# Patient Record
Sex: FEMALE | Race: WHITE | NOT HISPANIC OR LATINO | Employment: OTHER | URBAN - METROPOLITAN AREA
[De-identification: names, ages, dates, MRNs, and addresses within clinical notes are randomized per-mention and may not be internally consistent; named-entity substitution may affect disease eponyms.]

---

## 2023-04-13 DIAGNOSIS — M15.9 PRIMARY OSTEOARTHRITIS INVOLVING MULTIPLE JOINTS: Primary | ICD-10-CM

## 2023-05-12 ENCOUNTER — APPOINTMENT (OUTPATIENT)
Dept: PRIMARY CARE | Facility: CLINIC | Age: 68
End: 2023-05-12
Payer: MEDICARE

## 2023-05-15 ENCOUNTER — APPOINTMENT (OUTPATIENT)
Dept: PRIMARY CARE | Facility: CLINIC | Age: 68
End: 2023-05-15
Payer: MEDICARE

## 2023-06-21 ENCOUNTER — APPOINTMENT (OUTPATIENT)
Dept: PRIMARY CARE | Facility: CLINIC | Age: 68
End: 2023-06-21
Payer: MEDICARE

## 2023-07-13 ENCOUNTER — OFFICE VISIT (OUTPATIENT)
Dept: PRIMARY CARE | Facility: CLINIC | Age: 68
End: 2023-07-13
Payer: MEDICARE

## 2023-07-13 VITALS
HEART RATE: 81 BPM | WEIGHT: 293 LBS | DIASTOLIC BLOOD PRESSURE: 115 MMHG | HEIGHT: 65 IN | SYSTOLIC BLOOD PRESSURE: 202 MMHG | BODY MASS INDEX: 48.82 KG/M2 | OXYGEN SATURATION: 95 %

## 2023-07-13 DIAGNOSIS — M25.561 CHRONIC PAIN OF BOTH KNEES: ICD-10-CM

## 2023-07-13 DIAGNOSIS — N18.32 CHRONIC KIDNEY DISEASE, STAGE 3B (MULTI): ICD-10-CM

## 2023-07-13 DIAGNOSIS — N18.2 CHRONIC KIDNEY DISEASE, STAGE 2 (MILD): ICD-10-CM

## 2023-07-13 DIAGNOSIS — M25.562 CHRONIC PAIN OF BOTH KNEES: ICD-10-CM

## 2023-07-13 DIAGNOSIS — R73.03 PREDIABETES: ICD-10-CM

## 2023-07-13 DIAGNOSIS — Z12.31 ENCOUNTER FOR SCREENING MAMMOGRAM FOR MALIGNANT NEOPLASM OF BREAST: Primary | ICD-10-CM

## 2023-07-13 DIAGNOSIS — G89.29 CHRONIC PAIN OF BOTH KNEES: ICD-10-CM

## 2023-07-13 DIAGNOSIS — Z99.89 DEPENDENT ON WALKER FOR AMBULATION: ICD-10-CM

## 2023-07-13 DIAGNOSIS — I10 BENIGN ESSENTIAL HYPERTENSION: ICD-10-CM

## 2023-07-13 DIAGNOSIS — Z00.00 ROUTINE GENERAL MEDICAL EXAMINATION AT HEALTH CARE FACILITY: ICD-10-CM

## 2023-07-13 DIAGNOSIS — I48.91 UNSPECIFIED ATRIAL FIBRILLATION (MULTI): ICD-10-CM

## 2023-07-13 DIAGNOSIS — K21.9 GASTROESOPHAGEAL REFLUX DISEASE WITHOUT ESOPHAGITIS: ICD-10-CM

## 2023-07-13 DIAGNOSIS — N18.4 CHRONIC KIDNEY DISEASE, STAGE 4 (SEVERE) (MULTI): ICD-10-CM

## 2023-07-13 DIAGNOSIS — E55.9 VITAMIN D DEFICIENCY: ICD-10-CM

## 2023-07-13 DIAGNOSIS — E03.8 SUBCLINICAL HYPOTHYROIDISM: ICD-10-CM

## 2023-07-13 DIAGNOSIS — F41.9 ANXIETY: ICD-10-CM

## 2023-07-13 PROBLEM — I89.0 LYMPHEDEMA: Status: ACTIVE | Noted: 2023-07-13

## 2023-07-13 PROBLEM — L03.119 CELLULITIS OF LOWER LEG: Status: ACTIVE | Noted: 2023-07-13

## 2023-07-13 PROBLEM — R60.0 BILATERAL EDEMA OF LOWER EXTREMITY: Status: ACTIVE | Noted: 2023-07-13

## 2023-07-13 PROBLEM — R06.02 SHORTNESS OF BREATH: Status: ACTIVE | Noted: 2023-07-13

## 2023-07-13 PROCEDURE — G0439 PPPS, SUBSEQ VISIT: HCPCS | Performed by: NURSE PRACTITIONER

## 2023-07-13 PROCEDURE — 3077F SYST BP >= 140 MM HG: CPT | Performed by: NURSE PRACTITIONER

## 2023-07-13 PROCEDURE — 1170F FXNL STATUS ASSESSED: CPT | Performed by: NURSE PRACTITIONER

## 2023-07-13 PROCEDURE — 1125F AMNT PAIN NOTED PAIN PRSNT: CPT | Performed by: NURSE PRACTITIONER

## 2023-07-13 PROCEDURE — 1036F TOBACCO NON-USER: CPT | Performed by: NURSE PRACTITIONER

## 2023-07-13 PROCEDURE — 1159F MED LIST DOCD IN RCRD: CPT | Performed by: NURSE PRACTITIONER

## 2023-07-13 PROCEDURE — 99214 OFFICE O/P EST MOD 30 MIN: CPT | Performed by: NURSE PRACTITIONER

## 2023-07-13 PROCEDURE — 3080F DIAST BP >= 90 MM HG: CPT | Performed by: NURSE PRACTITIONER

## 2023-07-13 PROCEDURE — 1160F RVW MEDS BY RX/DR IN RCRD: CPT | Performed by: NURSE PRACTITIONER

## 2023-07-13 RX ORDER — HYDRALAZINE HYDROCHLORIDE 50 MG/1
50 TABLET, FILM COATED ORAL 2 TIMES DAILY
Qty: 90 TABLET | Refills: 1 | Status: SHIPPED | OUTPATIENT
Start: 2023-07-13 | End: 2023-07-31

## 2023-07-13 RX ORDER — OMEPRAZOLE 40 MG/1
40 CAPSULE, DELAYED RELEASE ORAL
Qty: 90 CAPSULE | Refills: 1 | Status: SHIPPED | OUTPATIENT
Start: 2023-07-13

## 2023-07-13 RX ORDER — DILTIAZEM HYDROCHLORIDE 360 MG/1
360 CAPSULE, EXTENDED RELEASE ORAL EVERY MORNING
Qty: 90 CAPSULE | Refills: 1 | Status: SHIPPED | OUTPATIENT
Start: 2023-07-13

## 2023-07-13 RX ORDER — OMEPRAZOLE 40 MG/1
40 CAPSULE, DELAYED RELEASE ORAL
COMMUNITY
Start: 2021-06-23 | End: 2023-07-13 | Stop reason: SDUPTHER

## 2023-07-13 RX ORDER — GABAPENTIN 100 MG/1
100 CAPSULE ORAL 3 TIMES DAILY
COMMUNITY
End: 2023-07-13 | Stop reason: SDUPTHER

## 2023-07-13 RX ORDER — HYDRALAZINE HYDROCHLORIDE 50 MG/1
75 TABLET, FILM COATED ORAL 2 TIMES DAILY
COMMUNITY
Start: 2022-05-02 | End: 2023-07-13 | Stop reason: SDUPTHER

## 2023-07-13 RX ORDER — GABAPENTIN 300 MG/1
300 CAPSULE ORAL 3 TIMES DAILY
Qty: 270 CAPSULE | Refills: 1 | Status: SHIPPED | OUTPATIENT
Start: 2023-07-13

## 2023-07-13 ASSESSMENT — ACTIVITIES OF DAILY LIVING (ADL)
DOING_HOUSEWORK: NEEDS ASSISTANCE
MANAGING_FINANCES: INDEPENDENT
DRESSING: INDEPENDENT
BATHING: NEEDS ASSISTANCE
GROCERY_SHOPPING: NEEDS ASSISTANCE
TAKING_MEDICATION: INDEPENDENT

## 2023-07-13 ASSESSMENT — ENCOUNTER SYMPTOMS
PSYCHIATRIC NEGATIVE: 1
FEVER: 0
CHILLS: 0
SORE THROAT: 0
WEAKNESS: 0
COUGH: 0
DIZZINESS: 0
PALPITATIONS: 0
DIARRHEA: 0
FATIGUE: 0
NUMBNESS: 0
VOMITING: 0
ABDOMINAL PAIN: 0
NAUSEA: 0
HEADACHES: 0
SHORTNESS OF BREATH: 0
MUSCULOSKELETAL NEGATIVE: 1
CONSTIPATION: 0

## 2023-07-13 ASSESSMENT — PATIENT HEALTH QUESTIONNAIRE - PHQ9
2. FEELING DOWN, DEPRESSED OR HOPELESS: NOT AT ALL
SUM OF ALL RESPONSES TO PHQ9 QUESTIONS 1 AND 2: 0
1. LITTLE INTEREST OR PLEASURE IN DOING THINGS: NOT AT ALL

## 2023-07-13 NOTE — PROGRESS NOTES
"Subjective   Patient ID: Chanda Gallego is a 68 y.o. female who presents for follow up.    HPI   Not doing well overall.  Feeling generally not well.  Knees are hurting her this summer. Using Tylenol for the pain.    Concerned about her kidneys.  Has history of worsening kidney function.  Has not seen nephrology.  BP is not controlled.  Has been taking BP at home.  Mostly 140/80.  When it reaches 170 systolic she takes a hydralazine.  Has a history of afib?  Does not take any medications due to cost.  Denies chest pain, SOB, palpitations, dizziness,  or GI issues.        Review of Systems   Constitutional:  Negative for chills, fatigue and fever.   HENT:  Negative for congestion, ear pain and sore throat.    Eyes:  Negative for visual disturbance.   Respiratory:  Negative for cough and shortness of breath.    Cardiovascular:  Negative for chest pain, palpitations and leg swelling.   Gastrointestinal:  Negative for abdominal pain, constipation, diarrhea, nausea and vomiting.   Genitourinary: Negative.    Musculoskeletal: Negative.    Skin:  Negative for rash.   Neurological:  Negative for dizziness, weakness, numbness and headaches.   Psychiatric/Behavioral: Negative.         Objective   BP (!) 202/115   Pulse 81   Ht 1.651 m (5' 5\")   Wt 135 kg (297 lb)   SpO2 95%   BMI 49.42 kg/m²     Physical Exam  Constitutional:       General: She is not in acute distress.     Appearance: Normal appearance.   HENT:      Head: Normocephalic and atraumatic.      Right Ear: Tympanic membrane, ear canal and external ear normal.      Left Ear: Tympanic membrane, ear canal and external ear normal.      Nose: Nose normal.      Mouth/Throat:      Mouth: Mucous membranes are moist.      Pharynx: Oropharynx is clear.   Eyes:      Extraocular Movements: Extraocular movements intact.      Conjunctiva/sclera: Conjunctivae normal.      Pupils: Pupils are equal, round, and reactive to light.   Cardiovascular:      Rate and Rhythm: " Normal rate and regular rhythm.      Pulses: Normal pulses.      Heart sounds: Normal heart sounds. No murmur heard.  Pulmonary:      Effort: Pulmonary effort is normal.      Breath sounds: Normal breath sounds. No wheezing, rhonchi or rales.   Abdominal:      General: Bowel sounds are normal.      Palpations: Abdomen is soft.      Tenderness: There is no abdominal tenderness.   Musculoskeletal:         General: Normal range of motion.      Cervical back: Normal range of motion and neck supple.   Lymphadenopathy:      Comments: No lymphadenopathy noted   Skin:     General: Skin is warm and dry.      Findings: No rash.   Neurological:      General: No focal deficit present.      Mental Status: She is alert and oriented to person, place, and time.      Cranial Nerves: No cranial nerve deficit.      Coordination: Coordination normal.      Gait: Gait normal.   Psychiatric:         Mood and Affect: Mood normal.         Behavior: Behavior normal.       Assessment/Plan   Problem List Items Addressed This Visit       GERD (gastroesophageal reflux disease)     Take omeprazole as directed         Relevant Medications    omeprazole (PriLOSEC) 40 mg DR capsule    Anxiety    Benign essential hypertension     Continue Tiadylt  mg every 24 hours  Take Hydralazine 50 twice daily         Relevant Medications    dilTIAZem ER (Tiadylt ER) 360 mg 24 hr capsule    hydrALAZINE (Apresoline) 50 mg tablet    Chronic kidney disease, stage 3b (CMS/HCC)     Have fasting labs drawn         Relevant Orders    Comprehensive Metabolic Panel    Chronic pain of both knees     Increase Neurontin to 300 mg three times daily for pain.  May take Tylenol for break through pain.         Relevant Medications    gabapentin (Neurontin) 300 mg capsule    Subclinical hypothyroidism     Have labs drawn as soon as possible         Relevant Orders    TSH with reflex to Free T4 if abnormal    Vitamin D deficiency     Have labs drawn as soon as possible           Prediabetes     Have labs drawn as soon as possible          Relevant Orders    Comprehensive Metabolic Panel    TSH with reflex to Free T4 if abnormal    Lipid Panel    Vitamin D, Total    CBC and Auto Differential    BI mammo bilateral screening tomosynthesis    Hemoglobin A1C    Routine general medical examination at health care facility - Primary     Other Visit Diagnoses       Unspecified atrial fibrillation (CMS/HCC)        Relevant Medications    dilTIAZem ER (Tiadylt ER) 360 mg 24 hr capsule    Chronic kidney disease, stage 2 (mild)        Relevant Orders    Lipid Panel    Chronic kidney disease, stage 4 (severe) (CMS/HCC)        Relevant Orders    Vitamin D, Total        Ordered rollator for decreased ability to ambulate without assistance  Patient permanently defers mammogram or colon screening

## 2023-07-13 NOTE — PATIENT INSTRUCTIONS
Start Neurontin 300 mg 3 times a day for pain neuro gabapentin vascular okay  May take Tylenol as needed for breakthrough pain  Monitor and record blood pressure at home  Continue Cardizem and hydralazine for blood pressure as prescribed  Reduce dietary sodium  Have fasting blood work drawn.  Must fast for 12 hours can have black coffee,.  Water, or tea.  Schedule annual mammogram patient refuses  Will consider Nephrology consult, patient not interested at this point.

## 2023-07-13 NOTE — ASSESSMENT & PLAN NOTE
Increase Neurontin to 300 mg three times daily for pain.  May take Tylenol for break through pain.

## 2023-07-28 DIAGNOSIS — I10 BENIGN ESSENTIAL HYPERTENSION: ICD-10-CM

## 2023-07-31 RX ORDER — HYDRALAZINE HYDROCHLORIDE 50 MG/1
50 TABLET, FILM COATED ORAL 2 TIMES DAILY
Qty: 180 TABLET | Refills: 1 | Status: SHIPPED | OUTPATIENT
Start: 2023-07-31

## 2024-06-20 DIAGNOSIS — I48.91 UNSPECIFIED ATRIAL FIBRILLATION (MULTI): ICD-10-CM

## 2024-06-20 DIAGNOSIS — M25.562 CHRONIC PAIN OF BOTH KNEES: ICD-10-CM

## 2024-06-20 DIAGNOSIS — K21.9 GASTROESOPHAGEAL REFLUX DISEASE WITHOUT ESOPHAGITIS: ICD-10-CM

## 2024-06-20 DIAGNOSIS — M25.561 CHRONIC PAIN OF BOTH KNEES: ICD-10-CM

## 2024-06-20 DIAGNOSIS — G89.29 CHRONIC PAIN OF BOTH KNEES: ICD-10-CM

## 2024-06-20 DIAGNOSIS — I10 BENIGN ESSENTIAL HYPERTENSION: ICD-10-CM

## 2024-06-21 DIAGNOSIS — E55.9 VITAMIN D DEFICIENCY: ICD-10-CM

## 2024-06-21 DIAGNOSIS — Z00.00 ROUTINE MEDICAL EXAM: ICD-10-CM

## 2024-06-21 DIAGNOSIS — R60.0 BILATERAL EDEMA OF LOWER EXTREMITY: ICD-10-CM

## 2024-06-21 DIAGNOSIS — R53.83 FATIGUE, UNSPECIFIED TYPE: ICD-10-CM

## 2024-06-21 DIAGNOSIS — I10 BENIGN ESSENTIAL HYPERTENSION: ICD-10-CM

## 2024-06-21 DIAGNOSIS — R73.03 PREDIABETES: ICD-10-CM

## 2024-06-21 DIAGNOSIS — N18.32 CHRONIC KIDNEY DISEASE, STAGE 3B (MULTI): ICD-10-CM

## 2024-06-21 RX ORDER — HYDRALAZINE HYDROCHLORIDE 50 MG/1
50 TABLET, FILM COATED ORAL 2 TIMES DAILY
Qty: 60 TABLET | Refills: 0 | Status: SHIPPED | OUTPATIENT
Start: 2024-06-21

## 2024-06-21 RX ORDER — DILTIAZEM HYDROCHLORIDE 360 MG/1
360 CAPSULE, EXTENDED RELEASE ORAL DAILY
Qty: 30 CAPSULE | Refills: 0 | Status: SHIPPED | OUTPATIENT
Start: 2024-06-21 | End: 2024-07-21

## 2024-06-21 RX ORDER — GABAPENTIN 300 MG/1
300 CAPSULE ORAL 3 TIMES DAILY
Qty: 90 CAPSULE | Refills: 0 | Status: SHIPPED | OUTPATIENT
Start: 2024-06-21

## 2024-06-21 RX ORDER — OMEPRAZOLE 40 MG/1
40 CAPSULE, DELAYED RELEASE ORAL
Qty: 30 CAPSULE | Refills: 0 | Status: SHIPPED | OUTPATIENT
Start: 2024-06-21

## 2024-07-16 ENCOUNTER — APPOINTMENT (OUTPATIENT)
Dept: PRIMARY CARE | Facility: CLINIC | Age: 69
End: 2024-07-16
Payer: MEDICARE

## 2024-07-16 VITALS
BODY MASS INDEX: 49.92 KG/M2 | WEIGHT: 293 LBS | SYSTOLIC BLOOD PRESSURE: 212 MMHG | HEART RATE: 68 BPM | OXYGEN SATURATION: 95 % | DIASTOLIC BLOOD PRESSURE: 108 MMHG

## 2024-07-16 DIAGNOSIS — G89.29 CHRONIC PAIN OF BOTH KNEES: ICD-10-CM

## 2024-07-16 DIAGNOSIS — N18.4 CHRONIC KIDNEY DISEASE, STAGE 4 (SEVERE) (MULTI): Primary | ICD-10-CM

## 2024-07-16 DIAGNOSIS — I10 BENIGN ESSENTIAL HYPERTENSION: ICD-10-CM

## 2024-07-16 DIAGNOSIS — I50.42 CHRONIC COMBINED SYSTOLIC AND DIASTOLIC CONGESTIVE HEART FAILURE (MULTI): ICD-10-CM

## 2024-07-16 DIAGNOSIS — I48.91 UNSPECIFIED ATRIAL FIBRILLATION (MULTI): ICD-10-CM

## 2024-07-16 DIAGNOSIS — M25.562 CHRONIC PAIN OF BOTH KNEES: ICD-10-CM

## 2024-07-16 DIAGNOSIS — M25.561 CHRONIC PAIN OF BOTH KNEES: ICD-10-CM

## 2024-07-16 PROCEDURE — 3077F SYST BP >= 140 MM HG: CPT | Performed by: INTERNAL MEDICINE

## 2024-07-16 PROCEDURE — 1159F MED LIST DOCD IN RCRD: CPT | Performed by: INTERNAL MEDICINE

## 2024-07-16 PROCEDURE — 99214 OFFICE O/P EST MOD 30 MIN: CPT | Performed by: INTERNAL MEDICINE

## 2024-07-16 PROCEDURE — 1160F RVW MEDS BY RX/DR IN RCRD: CPT | Performed by: INTERNAL MEDICINE

## 2024-07-16 PROCEDURE — 3080F DIAST BP >= 90 MM HG: CPT | Performed by: INTERNAL MEDICINE

## 2024-07-16 PROCEDURE — 1036F TOBACCO NON-USER: CPT | Performed by: INTERNAL MEDICINE

## 2024-07-16 RX ORDER — HYDRALAZINE HYDROCHLORIDE 50 MG/1
50 TABLET, FILM COATED ORAL 2 TIMES DAILY
Qty: 180 TABLET | Refills: 1 | Status: SHIPPED | OUTPATIENT
Start: 2024-07-16

## 2024-07-16 RX ORDER — GABAPENTIN 300 MG/1
300 CAPSULE ORAL 3 TIMES DAILY
Qty: 270 CAPSULE | Refills: 1 | Status: SHIPPED | OUTPATIENT
Start: 2024-07-16

## 2024-07-16 RX ORDER — DILTIAZEM HYDROCHLORIDE 360 MG/1
360 CAPSULE, EXTENDED RELEASE ORAL DAILY
Qty: 90 CAPSULE | Refills: 1 | Status: SHIPPED | OUTPATIENT
Start: 2024-07-16 | End: 2025-01-12

## 2024-07-16 NOTE — ASSESSMENT & PLAN NOTE
Pt has not followed up with nephrology  States she does not urinate enough to get a 24hr urine collection  Encouraged to get labs  Avoid nsaids  Limit sodium d/w pt  Hydration  Encouraged for follow up with nephrology

## 2024-07-16 NOTE — PATIENT INSTRUCTIONS
Hypertension  Your bp was very high today, but on repeat a little better, but still high  You need to restart your hydralazine 50mg twice daily  Continue diltiazem    Atrial fibrillation,   Your heart is regular today  You are not taking the eliquis, still unclear why, but had to do with paperwork  Please follow up with cardiology annually    Kidney disease, suddenly worse due to bilateral kidney stones  Get labs, you have not had labs in over 2 years, very important to check regularly  Recheck in 6months  Follow up with nephrology    Urine incontinence, see urology    Declines mammogram and colon screening    Recommend the following vaccines  Prevnar 20 to prevent pneumonia  RSV shot to prevent RSV  Tdap, tetanus, which is due every 10 years  These are all free at the pharmacy    Get labs today, orders are in the computer

## 2024-07-16 NOTE — PROGRESS NOTES
Subjective   Patient ID: Chanda Gallego is a 69 y.o. female who presents for No chief complaint on file..    She takes omeprazole prn, usually after red sauce. No food sticking  Has a prior esophageal stretching  She has not been taking her hydralazine because had to change to the mail order, but could not get from the mail order    Also not taking diltiazem. Or the hydralazine  Pt states she did not know she had bad kidneys. But then saw nephrology and they told her to avoid sodium  She has trouble with bladder control.     She had cardioversion 2 years ago  No palpitations  She has not been taking her blood thinner eliquis through patient assistance, but was too many pages for her ?? Or was too many copies for her (unclear why ), she has not been taking, got a 1 month free.   She takes tylenol  She takes gabapentin for pain in her knees and thumbs that is arthritis    Her knees, she saw orthopedics, who said she did not qualify for surgery but got a shot in her knee  The gabapentin makes her sleepy.     No cp or pressure  She is not sob  No palpitations, she had a monitor but sometimes gets fleeting palp  Bowels are regular. Sometimes constipated. Takes a salad  She gets very tired.   Poor urine control.   She doesn't sleep more than 3 hours and wakes and will sleep 3 hours more.   She takes 1 joby 3 times a day.    Had stretching and botox in her esophagus.   No food sticking currently.     She doesn't do the flu shot due to eggs in it.   She can eat an egg sandwich  She is walking with a walker.          Review of Systems    Objective   BP (!) 212/108   Pulse 68   Wt 136 kg (300 lb)   SpO2 95%   BMI 49.92 kg/m²     Physical Exam  Constitutional:       Appearance: Normal appearance.   Neck:      Vascular: No carotid bruit.   Cardiovascular:      Rate and Rhythm: Normal rate and regular rhythm.      Heart sounds: No murmur heard.  Pulmonary:      Effort: Pulmonary effort is normal.      Breath sounds: Normal  breath sounds.   Abdominal:      Palpations: Abdomen is soft.      Tenderness: There is no abdominal tenderness.   Musculoskeletal:      Right lower leg: Edema (1+) present.      Left lower leg: Edema (2+) present.   Lymphadenopathy:      Cervical: No cervical adenopathy.   Skin:     Coloration: Skin is not jaundiced or pale.   Neurological:      Mental Status: She is alert and oriented to person, place, and time.         Assessment/Plan          Patient Instructions   Hypertension  Your bp was very high today, but on repeat a little better, but still high  You need to restart your hydralazine 50mg twice daily  Continue diltiazem    Atrial fibrillation,   Your heart is regular today  You are not taking the eliquis, still unclear why, but had to do with paperwork  Please follow up with cardiology annually    Kidney disease, suddenly worse due to bilateral kidney stones  Get labs, you have not had labs in over 2 years, very important to check regularly  Recheck in 6months  Follow up with nephrology    Urine incontinence, see urology    Declines mammogram and colon screening    Recommend the following vaccines  Prevnar 20 to prevent pneumonia  RSV shot to prevent RSV  Tdap, tetanus, which is due every 10 years  These are all free at the pharmacy    Get labs today, orders are in the computer

## 2024-08-15 ENCOUNTER — LAB (OUTPATIENT)
Dept: LAB | Facility: LAB | Age: 69
End: 2024-08-15
Payer: MEDICARE

## 2024-08-15 DIAGNOSIS — N18.32 CHRONIC KIDNEY DISEASE, STAGE 3B (MULTI): ICD-10-CM

## 2024-08-15 DIAGNOSIS — R73.03 PREDIABETES: ICD-10-CM

## 2024-08-15 DIAGNOSIS — R53.83 FATIGUE, UNSPECIFIED TYPE: ICD-10-CM

## 2024-08-15 DIAGNOSIS — E55.9 VITAMIN D DEFICIENCY: ICD-10-CM

## 2024-08-15 DIAGNOSIS — R60.0 BILATERAL EDEMA OF LOWER EXTREMITY: ICD-10-CM

## 2024-08-15 DIAGNOSIS — Z00.00 ROUTINE MEDICAL EXAM: ICD-10-CM

## 2024-08-15 DIAGNOSIS — I10 BENIGN ESSENTIAL HYPERTENSION: ICD-10-CM

## 2024-08-15 LAB
25(OH)D3 SERPL-MCNC: 9 NG/ML (ref 30–100)
ALBUMIN SERPL BCP-MCNC: 4.4 G/DL (ref 3.4–5)
ALP SERPL-CCNC: 92 U/L (ref 33–136)
ALT SERPL W P-5'-P-CCNC: 9 U/L (ref 7–45)
ANION GAP SERPL CALC-SCNC: 15 MMOL/L (ref 10–20)
AST SERPL W P-5'-P-CCNC: 11 U/L (ref 9–39)
BILIRUB SERPL-MCNC: 0.6 MG/DL (ref 0–1.2)
BUN SERPL-MCNC: 42 MG/DL (ref 6–23)
CALCIUM SERPL-MCNC: 8.8 MG/DL (ref 8.6–10.3)
CHLORIDE SERPL-SCNC: 108 MMOL/L (ref 98–107)
CHOLEST SERPL-MCNC: 160 MG/DL (ref 0–199)
CHOLESTEROL/HDL RATIO: 3.2
CO2 SERPL-SCNC: 25 MMOL/L (ref 21–32)
CREAT SERPL-MCNC: 2.81 MG/DL (ref 0.5–1.05)
EGFRCR SERPLBLD CKD-EPI 2021: 17 ML/MIN/1.73M*2
ERYTHROCYTE [DISTWIDTH] IN BLOOD BY AUTOMATED COUNT: 14.9 % (ref 11.5–14.5)
EST. AVERAGE GLUCOSE BLD GHB EST-MCNC: 105 MG/DL
GLUCOSE SERPL-MCNC: 128 MG/DL (ref 74–99)
HBA1C MFR BLD: 5.3 %
HCT VFR BLD AUTO: 42.5 % (ref 36–46)
HDLC SERPL-MCNC: 50.7 MG/DL
HGB BLD-MCNC: 13 G/DL (ref 12–16)
LDLC SERPL CALC-MCNC: 73 MG/DL
MCH RBC QN AUTO: 28.1 PG (ref 26–34)
MCHC RBC AUTO-ENTMCNC: 30.6 G/DL (ref 32–36)
MCV RBC AUTO: 92 FL (ref 80–100)
NON HDL CHOLESTEROL: 109 MG/DL (ref 0–149)
NRBC BLD-RTO: 0 /100 WBCS (ref 0–0)
PLATELET # BLD AUTO: 191 X10*3/UL (ref 150–450)
POTASSIUM SERPL-SCNC: 4.8 MMOL/L (ref 3.5–5.3)
PROT SERPL-MCNC: 7 G/DL (ref 6.4–8.2)
RBC # BLD AUTO: 4.62 X10*6/UL (ref 4–5.2)
SODIUM SERPL-SCNC: 143 MMOL/L (ref 136–145)
TRIGL SERPL-MCNC: 180 MG/DL (ref 0–149)
TSH SERPL-ACNC: 2.65 MIU/L (ref 0.44–3.98)
VLDL: 36 MG/DL (ref 0–40)
WBC # BLD AUTO: 4.3 X10*3/UL (ref 4.4–11.3)

## 2024-08-15 PROCEDURE — 80053 COMPREHEN METABOLIC PANEL: CPT

## 2024-08-15 PROCEDURE — 80061 LIPID PANEL: CPT

## 2024-08-15 PROCEDURE — 83036 HEMOGLOBIN GLYCOSYLATED A1C: CPT

## 2024-08-15 PROCEDURE — 36415 COLL VENOUS BLD VENIPUNCTURE: CPT

## 2024-08-15 PROCEDURE — 85027 COMPLETE CBC AUTOMATED: CPT

## 2024-08-15 PROCEDURE — 82306 VITAMIN D 25 HYDROXY: CPT

## 2024-08-15 PROCEDURE — 84443 ASSAY THYROID STIM HORMONE: CPT

## 2024-10-28 ENCOUNTER — OFFICE VISIT (OUTPATIENT)
Dept: CARDIOLOGY | Facility: HOSPITAL | Age: 69
End: 2024-10-28
Payer: MEDICARE

## 2024-10-28 VITALS
BODY MASS INDEX: 50.96 KG/M2 | SYSTOLIC BLOOD PRESSURE: 163 MMHG | DIASTOLIC BLOOD PRESSURE: 75 MMHG | WEIGHT: 293 LBS | HEART RATE: 94 BPM | OXYGEN SATURATION: 97 %

## 2024-10-28 DIAGNOSIS — I10 BENIGN ESSENTIAL HYPERTENSION: Primary | ICD-10-CM

## 2024-10-28 DIAGNOSIS — I48.91 ATRIAL FIBRILLATION, UNSPECIFIED TYPE (MULTI): ICD-10-CM

## 2024-10-28 LAB
ATRIAL RATE: 105 BPM
Q ONSET: 214 MS
QRS COUNT: 13 BEATS
QRS DURATION: 82 MS
QT INTERVAL: 384 MS
QTC CALCULATION(BAZETT): 446 MS
QTC FREDERICIA: 424 MS
R AXIS: -37 DEGREES
T AXIS: 31 DEGREES
T OFFSET: 406 MS
VENTRICULAR RATE: 81 BPM

## 2024-10-28 PROCEDURE — 1159F MED LIST DOCD IN RCRD: CPT | Performed by: NURSE PRACTITIONER

## 2024-10-28 PROCEDURE — 99214 OFFICE O/P EST MOD 30 MIN: CPT | Performed by: NURSE PRACTITIONER

## 2024-10-28 PROCEDURE — 1036F TOBACCO NON-USER: CPT | Performed by: NURSE PRACTITIONER

## 2024-10-28 PROCEDURE — 3078F DIAST BP <80 MM HG: CPT | Performed by: NURSE PRACTITIONER

## 2024-10-28 PROCEDURE — 93005 ELECTROCARDIOGRAM TRACING: CPT | Performed by: NURSE PRACTITIONER

## 2024-10-28 PROCEDURE — G2211 COMPLEX E/M VISIT ADD ON: HCPCS | Performed by: NURSE PRACTITIONER

## 2024-10-28 PROCEDURE — 3077F SYST BP >= 140 MM HG: CPT | Performed by: NURSE PRACTITIONER

## 2024-10-28 RX ORDER — DABIGATRAN ETEXILATE 150 MG/1
150 CAPSULE ORAL 2 TIMES DAILY
Qty: 60 CAPSULE | Refills: 11 | Status: SHIPPED | OUTPATIENT
Start: 2024-10-28 | End: 2025-10-28

## 2024-10-28 RX ORDER — ACETAMINOPHEN 500 MG
1000 TABLET ORAL EVERY 8 HOURS PRN
COMMUNITY

## 2024-10-28 ASSESSMENT — ENCOUNTER SYMPTOMS
DYSPNEA ON EXERTION: 1
MYALGIAS: 1
RESPIRATORY NEGATIVE: 1
GASTROINTESTINAL NEGATIVE: 1
PSYCHIATRIC NEGATIVE: 1
HEMATOLOGIC/LYMPHATIC NEGATIVE: 1
ENDOCRINE NEGATIVE: 1
NEUROLOGICAL NEGATIVE: 1
EYES NEGATIVE: 1

## 2024-12-08 NOTE — PROGRESS NOTES
[facility] V[facility] Electrophysiology    Patient Name: Chanda Gallego  Patient : 1955    Referred for  pAfib    History of Present Illness:  Chanda Gallego is a 69 y.o. year old female patient with:    pAfib : A few weeks her monitor showed atrial fibrillation. Complains of general fatigue and occasional shortness of breath. Denies chest pain or heart palpitations.   HTN  Subclinical hypothyroidism    Patient referred by Aylin Squires for atrial fibrillation. She comes in today in atrial fibrillation with RVR. Her ECG shows afib with HR ~130 bpm.     Past Medical History:  She has a past medical history of Personal history of other diseases of the circulatory system.    Past Surgical History:  She has a past surgical history that includes Other surgical history (12/10/2019).      Social History:  She reports that she has never smoked. She has never used smokeless tobacco. She reports that she does not drink alcohol and does not use drugs.    Family History:  Family History   Problem Relation Name Age of Onset    Coronary artery disease Mother           at 92    Scoliosis Mother      Deafness Father      Prostate cancer Father           at 75    Other (congential deafness) Father's Brother          Allergies:  Codeine, Morphine, and Sulfa (sulfonamide antibiotics)    Outpatient Medications:  Current Outpatient Medications   Medication Instructions    acetaminophen (TYLENOL) 1,000 mg, Every 8 hours PRN    dabigatran etexilate (PRADAXA) 150 mg, oral, 2 times daily, Do not crush or chew.    dilTIAZem ER (TIADYLT ER) 360 mg, oral, Daily    gabapentin (NEURONTIN) 300 mg, oral, 3 times daily    hydrALAZINE (APRESOLINE) 50 mg, oral, 2 times daily    omeprazole (PRILOSEC) 40 mg, oral, Daily before breakfast    walker misc Patient requires Rolator for inability to walk greater than 150 ft.        ROS:  A 14 point review of systems was done and is negative other than as stated in  HPI    Physical Exam  Constitutional:       Appearance: Normal appearance.   Cardiovascular:      Rate and Rhythm: Tachycardia present. Rhythm irregular.      Heart sounds: No murmur heard.     No friction rub. No gallop.   Pulmonary:      Effort: Pulmonary effort is normal.      Breath sounds: Normal breath sounds.   Abdominal:      Palpations: Abdomen is soft.   Musculoskeletal:      Cervical back: Neck supple.   Neurological:      Mental Status: She is alert.   Psychiatric:         Mood and Affect: Mood normal.         Behavior: Behavior normal.          Vitals:  There were no vitals taken for this visit.       Labs:   CBC  Lab Results   Component Value Date    WBC 4.3 (L) 08/15/2024    HGB 13.0 08/15/2024    HCT 42.5 08/15/2024    MCV 92 08/15/2024     08/15/2024        Renal Function Panel  Lab Results   Component Value Date    GLUCOSE 128 (H) 08/15/2024     08/15/2024    K 4.8 08/15/2024     (H) 08/15/2024    CO2 25 08/15/2024    ANIONGAP 15 08/15/2024    BUN 42 (H) 08/15/2024    CREATININE 2.81 (H) 08/15/2024    GFRF 17 (A) 05/02/2022    CALCIUM 8.8 08/15/2024        CMP  Lab Results   Component Value Date    CALCIUM 8.8 08/15/2024    PHOS 6.3 (H) 04/09/2022    PROT 7.0 08/15/2024    ALBUMIN 4.4 08/15/2024    AST 11 08/15/2024    ALT 9 08/15/2024    ALKPHOS 92 08/15/2024    BILITOT 0.6 08/15/2024       TSH  Lab Results   Component Value Date    TSH 2.65 08/15/2024    FREET4 1.20 04/03/2018          Cardiac Testing:  ECG  12/09/2024  afib with HR ~130 bpm    Echocardiogram  08/04/2022     PHYSICIAN INTERPRETATION:  ZANDRA Details: The ZANDRA probe used was Giv.to [id number]. Technically adequate omniplane transesophageal echocardiogram performed. Color flow Doppler echo was performed to assess for the presence of a patent foramen ovale.  ZANDRA Medication: The pharynx was anesthetized with Cetacaine spray and viscous lidocaine. The patient was sedated by Anesthesia; please refer to anesthesia flow sheet for  medications used. Total intraservice time for moderate sedation was 24 minutes.  ZANDRA Procedure: The probe was passed without difficulty. The patient tolerated the procedure well without any apparent complications.  Left Ventricle: The left ventricular systolic function is normal, with an estimated ejection fraction of 55-60%. The left ventricular cavity size is not assessed. Left ventricular diastolic filling was not assessed.  Left Atrium: The left atrium is mildly dilated. The left atrial appendage Doppler velocities are normal, there is faint spontaneous contrast noted in the left atrial appendage, there is no thrombus visualized in the left atrial appendage and there is a prominent pectinate muscle in left atrial appendage.  Right Ventricle: The right ventricle is normal in size. There is normal right ventricular global systolic function.  Right Atrium: The right atrium is mildly dilated. The right atrium has a prominent Chiari network.  Aortic Valve: The aortic valve is trileaflet. There is minimal aortic valve cusp calcification. There is no evidence of aortic valve stenosis.  There is no evidence of aortic valve regurgitation.  Mitral Valve: The mitral valve is normal in structure. There is trace mitral valve regurgitation.  Tricuspid Valve: The tricuspid valve is structurally normal. There is trace tricuspid regurgitation. The right ventricular systolic pressure is unable to be estimated.  Pulmonic Valve: The pulmonic valve is structurally normal. There is trace pulmonic valve regurgitation.  Pericardium: There is no pericardial effusion noted.  Aorta: The aortic root is normal. There is plaque visualized in the descending aorta which is classified as a Grade 2 [mild (focal or diffuse) intimal thickening of 2-3 mm] atherosclerosis.  Pulmonary Veins: The pulmonary veins appear normal and return normally to the left atrium.  In comparison to the previous echocardiogram(s): Compared with study from 5/20/2021,  no significant change.        CONCLUSIONS:   1. The left ventricular systolic function is normal with a 55-60% estimated ejection fraction.   2. There is plaque visualized in the descending aorta.   3. The left atrial appendage Doppler velocities are normal, there is faint spontaneous contrast noted in the left atrial appendage, there is no thrombus visualized in the left atrial appendage and there is a prominent pectinate muscle in left atrial appendage.        Assessment:     Patient referred by Aylin Squires for atrial fibrillation. She comes in today in atrial fibrillation with RVR. Her ECG shows afib with HR ~130 bpm. After resting for a few minutes, the rates improved.   Patient is not taking her Pradaxa regularly concerned with her renal impairment. Will reduce dose of Pradaxa to 75 mg bid due to CKD and we may consider starting her on AAD.   Will schedule her for a ZANDRA/cardioversion.   Will schedule a follow up to reassess.

## 2024-12-09 ENCOUNTER — OFFICE VISIT (OUTPATIENT)
Dept: CARDIOLOGY | Facility: HOSPITAL | Age: 69
End: 2024-12-09
Payer: MEDICARE

## 2024-12-09 VITALS
SYSTOLIC BLOOD PRESSURE: 132 MMHG | DIASTOLIC BLOOD PRESSURE: 75 MMHG | WEIGHT: 293 LBS | OXYGEN SATURATION: 98 % | BODY MASS INDEX: 50.19 KG/M2 | HEART RATE: 110 BPM

## 2024-12-09 DIAGNOSIS — I48.19 PERSISTENT ATRIAL FIBRILLATION (MULTI): ICD-10-CM

## 2024-12-09 DIAGNOSIS — I48.19 PERSISTENT ATRIAL FIBRILLATION (MULTI): Primary | ICD-10-CM

## 2024-12-09 DIAGNOSIS — I10 BENIGN ESSENTIAL HYPERTENSION: ICD-10-CM

## 2024-12-09 LAB
ATRIAL RATE: 115 BPM
PR INTERVAL: 128 MS
Q ONSET: 219 MS
QRS COUNT: 21 BEATS
QRS DURATION: 66 MS
QT INTERVAL: 360 MS
QTC CALCULATION(BAZETT): 529 MS
QTC FREDERICIA: 466 MS
R AXIS: 2 DEGREES
T AXIS: 17 DEGREES
T OFFSET: 399 MS
VENTRICULAR RATE: 130 BPM

## 2024-12-09 PROCEDURE — 1157F ADVNC CARE PLAN IN RCRD: CPT | Performed by: STUDENT IN AN ORGANIZED HEALTH CARE EDUCATION/TRAINING PROGRAM

## 2024-12-09 PROCEDURE — 93005 ELECTROCARDIOGRAM TRACING: CPT | Performed by: STUDENT IN AN ORGANIZED HEALTH CARE EDUCATION/TRAINING PROGRAM

## 2024-12-09 PROCEDURE — 3078F DIAST BP <80 MM HG: CPT | Performed by: STUDENT IN AN ORGANIZED HEALTH CARE EDUCATION/TRAINING PROGRAM

## 2024-12-09 PROCEDURE — 1159F MED LIST DOCD IN RCRD: CPT | Performed by: STUDENT IN AN ORGANIZED HEALTH CARE EDUCATION/TRAINING PROGRAM

## 2024-12-09 PROCEDURE — 99214 OFFICE O/P EST MOD 30 MIN: CPT | Performed by: STUDENT IN AN ORGANIZED HEALTH CARE EDUCATION/TRAINING PROGRAM

## 2024-12-09 PROCEDURE — 3075F SYST BP GE 130 - 139MM HG: CPT | Performed by: STUDENT IN AN ORGANIZED HEALTH CARE EDUCATION/TRAINING PROGRAM

## 2024-12-09 PROCEDURE — 99214 OFFICE O/P EST MOD 30 MIN: CPT | Mod: 25 | Performed by: STUDENT IN AN ORGANIZED HEALTH CARE EDUCATION/TRAINING PROGRAM

## 2024-12-09 PROCEDURE — 1036F TOBACCO NON-USER: CPT | Performed by: STUDENT IN AN ORGANIZED HEALTH CARE EDUCATION/TRAINING PROGRAM

## 2024-12-09 RX ORDER — DABIGATRAN ETEXILATE 75 MG/1
75 CAPSULE ORAL 2 TIMES DAILY
Qty: 60 CAPSULE | Refills: 3 | Status: SHIPPED | OUTPATIENT
Start: 2024-12-09 | End: 2025-12-09

## 2024-12-17 ENCOUNTER — ANESTHESIA EVENT (OUTPATIENT)
Dept: CARDIOLOGY | Facility: HOSPITAL | Age: 69
End: 2024-12-17
Payer: MEDICARE

## 2024-12-17 ENCOUNTER — HOSPITAL ENCOUNTER (OUTPATIENT)
Dept: CARDIOLOGY | Facility: HOSPITAL | Age: 69
Discharge: HOME | End: 2024-12-17
Payer: MEDICARE

## 2024-12-17 ENCOUNTER — ANESTHESIA (OUTPATIENT)
Dept: CARDIOLOGY | Facility: HOSPITAL | Age: 69
End: 2024-12-17
Payer: MEDICARE

## 2024-12-17 VITALS
HEART RATE: 62 BPM | RESPIRATION RATE: 16 BRPM | WEIGHT: 293 LBS | SYSTOLIC BLOOD PRESSURE: 177 MMHG | HEIGHT: 65 IN | BODY MASS INDEX: 48.82 KG/M2 | DIASTOLIC BLOOD PRESSURE: 89 MMHG | OXYGEN SATURATION: 96 %

## 2024-12-17 DIAGNOSIS — I48.91 UNSPECIFIED ATRIAL FIBRILLATION (MULTI): ICD-10-CM

## 2024-12-17 DIAGNOSIS — I48.19 PERSISTENT ATRIAL FIBRILLATION (MULTI): ICD-10-CM

## 2024-12-17 LAB
ATRIAL RATE: 64 BPM
EJECTION FRACTION: 58 %
P AXIS: 61 DEGREES
P OFFSET: 185 MS
P ONSET: 119 MS
PR INTERVAL: 190 MS
Q ONSET: 214 MS
QRS COUNT: 11 BEATS
QRS DURATION: 88 MS
QT INTERVAL: 426 MS
QTC CALCULATION(BAZETT): 439 MS
QTC FREDERICIA: 435 MS
R AXIS: -9 DEGREES
T AXIS: 40 DEGREES
T OFFSET: 427 MS
VENTRICULAR RATE: 64 BPM

## 2024-12-17 PROCEDURE — 3700000001 HC GENERAL ANESTHESIA TIME - INITIAL BASE CHARGE

## 2024-12-17 PROCEDURE — 2500000004 HC RX 250 GENERAL PHARMACY W/ HCPCS (ALT 636 FOR OP/ED): Performed by: NURSE ANESTHETIST, CERTIFIED REGISTERED

## 2024-12-17 PROCEDURE — 99152 MOD SED SAME PHYS/QHP 5/>YRS: CPT | Performed by: STUDENT IN AN ORGANIZED HEALTH CARE EDUCATION/TRAINING PROGRAM

## 2024-12-17 PROCEDURE — 93320 DOPPLER ECHO COMPLETE: CPT | Performed by: STUDENT IN AN ORGANIZED HEALTH CARE EDUCATION/TRAINING PROGRAM

## 2024-12-17 PROCEDURE — 3700000002 HC GENERAL ANESTHESIA TIME - EACH INCREMENTAL 1 MINUTE

## 2024-12-17 PROCEDURE — A93312 PR ECHO HEART,TRANSESOPHAGEAL,COMPLETE: Performed by: NURSE ANESTHETIST, CERTIFIED REGISTERED

## 2024-12-17 PROCEDURE — 99100 ANES PT EXTEME AGE<1 YR&>70: CPT | Performed by: ANESTHESIOLOGY

## 2024-12-17 PROCEDURE — 99152 MOD SED SAME PHYS/QHP 5/>YRS: CPT

## 2024-12-17 PROCEDURE — 7100000009 HC PHASE TWO TIME - INITIAL BASE CHARGE

## 2024-12-17 PROCEDURE — 2500000005 HC RX 250 GENERAL PHARMACY W/O HCPCS: Performed by: STUDENT IN AN ORGANIZED HEALTH CARE EDUCATION/TRAINING PROGRAM

## 2024-12-17 PROCEDURE — 93312 ECHO TRANSESOPHAGEAL: CPT | Performed by: STUDENT IN AN ORGANIZED HEALTH CARE EDUCATION/TRAINING PROGRAM

## 2024-12-17 PROCEDURE — 7100000010 HC PHASE TWO TIME - EACH INCREMENTAL 1 MINUTE

## 2024-12-17 PROCEDURE — 93325 DOPPLER ECHO COLOR FLOW MAPG: CPT | Performed by: STUDENT IN AN ORGANIZED HEALTH CARE EDUCATION/TRAINING PROGRAM

## 2024-12-17 PROCEDURE — 93010 ELECTROCARDIOGRAM REPORT: CPT | Performed by: STUDENT IN AN ORGANIZED HEALTH CARE EDUCATION/TRAINING PROGRAM

## 2024-12-17 PROCEDURE — 92960 CARDIOVERSION ELECTRIC EXT: CPT | Performed by: STUDENT IN AN ORGANIZED HEALTH CARE EDUCATION/TRAINING PROGRAM

## 2024-12-17 PROCEDURE — A93312 PR ECHO HEART,TRANSESOPHAGEAL,COMPLETE: Performed by: ANESTHESIOLOGY

## 2024-12-17 PROCEDURE — 93005 ELECTROCARDIOGRAM TRACING: CPT

## 2024-12-17 PROCEDURE — 93320 DOPPLER ECHO COMPLETE: CPT

## 2024-12-17 RX ORDER — LIDOCAINE HYDROCHLORIDE 20 MG/ML
SOLUTION OROPHARYNGEAL AS NEEDED
Status: DISCONTINUED | OUTPATIENT
Start: 2024-12-17 | End: 2024-12-17 | Stop reason: HOSPADM

## 2024-12-17 RX ORDER — LIDOCAINE HYDROCHLORIDE 10 MG/ML
INJECTION, SOLUTION INFILTRATION; PERINEURAL AS NEEDED
Status: DISCONTINUED | OUTPATIENT
Start: 2024-12-17 | End: 2024-12-17

## 2024-12-17 RX ORDER — PROPOFOL 10 MG/ML
INJECTION, EMULSION INTRAVENOUS AS NEEDED
Status: DISCONTINUED | OUTPATIENT
Start: 2024-12-17 | End: 2024-12-17

## 2024-12-17 ASSESSMENT — PAIN SCALES - GENERAL
PAINLEVEL_OUTOF10: 0 - NO PAIN
PAIN_LEVEL: 0

## 2024-12-17 ASSESSMENT — COLUMBIA-SUICIDE SEVERITY RATING SCALE - C-SSRS
2. HAVE YOU ACTUALLY HAD ANY THOUGHTS OF KILLING YOURSELF?: NO
6. HAVE YOU EVER DONE ANYTHING, STARTED TO DO ANYTHING, OR PREPARED TO DO ANYTHING TO END YOUR LIFE?: NO
1. IN THE PAST MONTH, HAVE YOU WISHED YOU WERE DEAD OR WISHED YOU COULD GO TO SLEEP AND NOT WAKE UP?: NO

## 2024-12-17 ASSESSMENT — PAIN - FUNCTIONAL ASSESSMENT
PAIN_FUNCTIONAL_ASSESSMENT: 0-10

## 2024-12-17 NOTE — ANESTHESIA POSTPROCEDURE EVALUATION
Patient: Chanda Gallego    Procedure Summary       Date: 12/17/24 Room / Location: [facility]    Anesthesia Start: 0803 Anesthesia Stop: 0816    Procedure: TRANSESOPHAGEAL ECHO (ZANDRA) W/ POSSIBLE CARDIOVERSION Diagnosis:       Persistent atrial fibrillation (Multi)      Unspecified atrial fibrillation (Multi)      (af)    Scheduled Providers: Javi Rios MD Responsible Provider: ISABEL Muñoz    Anesthesia Type: MAC ASA Status: 3            Anesthesia Type: MAC    Visit Vitals  /89   Pulse 62   Resp 16      Anesthesia Post Evaluation    Patient location during evaluation: bedside  Patient participation: complete - patient participated  Level of consciousness: awake and alert  Pain score: 0  Pain management: satisfactory to patient  Multimodal analgesia pain management approach  Airway patency: patent  Two or more strategies used to mitigate risk of obstructive sleep apnea  Cardiovascular status: acceptable  Respiratory status: acceptable  Hydration status: acceptable  Postoperative Nausea and Vomiting: none        There were no known notable events for this encounter.

## 2024-12-17 NOTE — ANESTHESIA PREPROCEDURE EVALUATION
Patient: Chanda Gallego    Procedure Information       Date/Time: 12/17/24 0730    Scheduled providers: Javi Rios MD    Procedure: TRANSESOPHAGEAL ECHO (ZANDRA) W/ POSSIBLE CARDIOVERSION    Location: [facility]            Relevant Problems   Cardiac   (+) Benign essential hypertension   (+) Chronic combined systolic and diastolic congestive heart failure      Neuro   (+) Anxiety      GI   (+) GERD (gastroesophageal reflux disease)      Endocrine   (+) Subclinical hypothyroidism       Clinical information reviewed:   Tobacco  Allergies  Meds   Med Hx  Surg Hx   Fam Hx  Soc Hx        NPO Detail:  NPO/Void Status  Date of Last Liquid: 12/17/24  Time of Last Liquid: 0001  Date of Last Solid: 12/17/24  Time of Last Solid: 0001         Physical Exam    Airway  Mallampati: III  TM distance: >3 FB  Neck ROM: full     Cardiovascular   Rhythm: irregular  Comments: A-fib   Dental    Pulmonary    Abdominal   (+) obese             Anesthesia Plan    History of general anesthesia?: unknown/emergency  History of complications of general anesthesia?: unknown/emergency    ASA 3     MAC     intravenous induction   Anesthetic plan and risks discussed with patient.    Plan discussed with CRNA and attending.

## 2024-12-17 NOTE — PROCEDURES
Procedure narrative:  The risks, benefits, and alternatives to the procedure and sedation were explained to the patient, and informed consent was obtained. The patient was in the fasting state. A grounding pad was placed. Self-adhesive anterior-posterior defibrillation pads were applied. A defibrillator was used for monitoring and the defibrillator waveform was set to biphasic. The patient was set up for continuous monitoring of surface 12 lead ECG, capnography, and pulse oximetry. Blood pressure was monitored with automatic cuff measurements. The procedure was performed under IV conscious sedation supplemented with intermittent deep sedation delivered by anaesthesia.    Transesophageal echo (ZANDRA) probe was inserted. There was no left atrial appendage thrombus found. ZANDRA probe was withdrawn successfully.    When pt had been adequately sedated, they were cardioverted with a 200J biphasic shock.  This restored sinus rhythm which was confirmed by tele and 12-lead ECG.       Summary:  Patient was successfully cardioverted from Atrial fibrillation to Sinus rhythm    Complications:  Patient tolerated the procedure well with no complications noted.      Patient Instructions:  - No Driving or making legal decisions for 24 hours  - DO NOT Stop your blood thinner unless emergency without checking in with your doctor     Follow up:   DO NOT Stop your blood thinner unless emergency without checking in with your doctor.  No driving, alcohol or making legal decisions for 24 hours.  Plan for follow up with patient's cardiologist/electrophysiologist as scheduled

## 2024-12-17 NOTE — SIGNIFICANT EVENT
Patient verbalizes understanding of discharge instructions. Tolerating sitting at bedside and standing well.

## 2024-12-17 NOTE — ADDENDUM NOTE
Addendum  created 12/17/24 1057 by Alon Montiel MD    Attestation recorded in Intraprocedure, Intraprocedure Attestations filed

## 2024-12-17 NOTE — H&P
"History Of Present Illness:    Chanda Gallego is a 69 y.o. female presenting rafi/dcv.  Taking pradaxa  No bleeding or dysphagia      Last Recorded Vitals:  Vitals:    12/17/24 0738 12/17/24 0741   BP: (!) 185/95 (!) 185/95   Pulse: 105 92   Resp: 16 16   SpO2: 98% 96%   Weight: 136 kg (300 lb)    Height: 1.651 m (5' 5\")        Last Labs:  CBC - 8/15/2024:  8:38 AM  4.3 13.0 191    42.5      CMP - 8/15/2024:  8:38 AM  8.8 7.0 11 --- 0.6   _ 4.4 9 92      PTT - No results in last year.  _   _ _     BNP   Date/Time Value Ref Range Status   07/17/2019 11:18  (H) 0 - 99 pg/mL Final     Comment:     .  <100 pg/mL - Heart failure unlikely  100-299 pg/mL - Intermediate probability of acute heart  .               failure exacerbation. Correlate with clinical  .               context and patient history.    >=300 pg/mL - Heart Failure likely. Correlate with clinical  .               context and patient history.  BNP testing is performed using different testing   methodology at [facility] than at other   system hospitals. Direct result comparisons should   only be made within the same method.     04/03/2018 09:04 AM 68 0 - 99 pg/mL Final     Comment:     .  <100 pg/mL - Heart failure unlikely  100-299 pg/mL - Intermediate probability of acute heart  .               failure exacerbation. Correlate with clinical  .               context and patient history.    >=300 pg/mL - Heart Failure likely. Correlate with clinical  .               context and patient history.  BNP testing is performed using different testing   methodology at [facility] than at other   system hospitals. Direct result comparisons should   only be made within the same method.       Hemoglobin A1C   Date/Time Value Ref Range Status   08/15/2024 08:38 AM 5.3 see below % Final   04/04/2022 05:48 AM 5.8 (A) % Final     Comment:          Diagnosis of Diabetes-Adults   Non-Diabetic: < or = 5.6%   Increased risk for developing " "diabetes: 5.7-6.4%   Diagnostic of diabetes: > or = 6.5%  .       Monitoring of Diabetes                Age (y)     Therapeutic Goal (%)   Adults:          >18           <7.0   Pediatrics:    13-18           <7.5                   7-12           <8.0                   0- 6            7.5-8.5   American Diabetes Association. Diabetes Care 33(S1), Jan 2010.     10/05/2020 04:53 PM 5.4 % Final     Comment:          Diagnosis of Diabetes-Adults   Non-Diabetic: < or = 5.6%   Increased risk for developing diabetes: 5.7-6.4%   Diagnostic of diabetes: > or = 6.5%  .       Monitoring of Diabetes                Age (y)     Therapeutic Goal (%)   Adults:          >18           <7.0   Pediatrics:    13-18           <7.5                   7-12           <8.0                   0- 6            7.5-8.5   American Diabetes Association. Diabetes Care 33(S1), Jan 2010.       LDL Calculated   Date/Time Value Ref Range Status   08/15/2024 08:38 AM 73 <=99 mg/dL Final     Comment:                                 Near   Borderline      AGE      Desirable  Optimal    High     High     Very High     0-19 Y     0 - 109     ---    110-129   >/= 130     ----    20-24 Y     0 - 119     ---    120-159   >/= 160     ----      >24 Y     0 -  99   100-129  130-159   160-189     >/=190       VLDL   Date/Time Value Ref Range Status   08/15/2024 08:38 AM 36 0 - 40 mg/dL Final   10/05/2020 04:53 PM 18 0 - 40 mg/dL Final   04/03/2018 09:04 AM 20 0 - 40 mg/dL Final      Last I/O:  No intake/output data recorded.    Past Cardiology Tests (Last 3 Years):  EKG:  ECG 12 lead (Clinic Performed) 12/09/2024 (Preliminary)      ECG 12 lead (Clinic Performed) 10/28/2024 (Preliminary)    Echo:  No results found for this or any previous visit from the past 1095 days.    Ejection Fractions:  No results found for: \"EF\"  Cath:  No results found for this or any previous visit from the past 1095 days.    Stress Test:  No results found for this or any previous visit from " the past 1095 days.    Cardiac Imaging:  No results found for this or any previous visit from the past 1095 days.      Past Medical History:  She has a past medical history of CHF (congestive heart failure), Chronic kidney disease, Hypertension, and Personal history of other diseases of the circulatory system.    Past Surgical History:  She has a past surgical history that includes Other surgical history (12/10/2019).      Social History:  She reports that she has never smoked. She has never used smokeless tobacco. She reports that she does not drink alcohol and does not use drugs.    Family History:  Family History   Problem Relation Name Age of Onset    Coronary artery disease Mother           at 92    Scoliosis Mother      Deafness Father      Prostate cancer Father           at 75    Other (congential deafness) Father's Brother          Allergies:  Codeine, Morphine, and Sulfa (sulfonamide antibiotics)    Inpatient Medications:        Outpatient Medications:  Current Outpatient Medications   Medication Instructions    acetaminophen (TYLENOL) 1,000 mg, Every 8 hours PRN    dabigatran etexilate (PRADAXA) 75 mg, oral, 2 times daily, Do not crush or chew.    dilTIAZem ER (TIADYLT ER) 360 mg, oral, Daily    gabapentin (NEURONTIN) 300 mg, oral, 3 times daily    hydrALAZINE (APRESOLINE) 50 mg, oral, 2 times daily    omeprazole (PRILOSEC) 40 mg, oral, Daily before breakfast    walker misc Patient requires Rolator for inability to walk greater than 150 ft.       Physical Exam:  irr     Assessment/Plan   Proceed with ZANDRA/DCV        Code Status:  No Order    I spent  minutes in the professional and overall care of this patient.        Javi Rios MD

## 2025-01-16 NOTE — PROGRESS NOTES
[facility] Heart and Vascular Electrophysiology    Patient Name: Chanda Gallego  Patient : 1955    Referred for  pAfib s/p DCCV    History of Present Illness:  Chanda Gallego is a 70 y.o. year old female patient with:    Afib s/p ZANDRA/DC cardioversion with recurrence : A few weeks her monitor showed atrial fibrillation. Complains of general fatigue and occasional shortness of breath. Denies chest pain or heart palpitations.   HTN  Subclinical hypothyroidism  Prior esophageal stretching   CKD  Obesity    Patient here for follow-up today for atrial fibrillation.  She underwent ZANDRA cardioversion back in December but recurred a day later according to her.  Her EKG today shows atrial fibrillation with a heart rate of 104 bpm.    Historically, patient was not taking her Eliquis and was switched to Pradaxa.  She was then not taking her Pradaxa concerned with her renal function.  Pradaxa was reduced to 75 mg twice daily for that reason.      Past Medical History:  She has a past medical history of CHF (congestive heart failure), Chronic kidney disease, Hypertension, and Personal history of other diseases of the circulatory system.    Past Surgical History:  She has a past surgical history that includes Other surgical history (12/10/2019).      Social History:  She reports that she has never smoked. She has never used smokeless tobacco. She reports that she does not drink alcohol and does not use drugs.    Family History:  Family History   Problem Relation Name Age of Onset    Coronary artery disease Mother           at 92    Scoliosis Mother      Deafness Father      Prostate cancer Father           at 75    Other (congential deafness) Father's Brother          Allergies:  Codeine, Morphine, and Sulfa (sulfonamide antibiotics)    Outpatient Medications:  Current Outpatient Medications   Medication Instructions    acetaminophen (TYLENOL) 1,000 mg, Every 8 hours PRN    dabigatran etexilate (PRADAXA) 75 mg,  oral, 2 times daily, Do not crush or chew.    dilTIAZem ER (TIADYLT ER) 360 mg, oral, Daily    gabapentin (NEURONTIN) 300 mg, oral, 3 times daily    hydrALAZINE (APRESOLINE) 50 mg, oral, 2 times daily    omeprazole (PRILOSEC) 40 mg, oral, Daily before breakfast    walker misc Patient requires Rolator for inability to walk greater than 150 ft.        ROS:  A 14 point review of systems was done and is negative other than as stated in HPI    Physical Exam  Constitutional:       Appearance: Normal appearance.   Cardiovascular:      Rate and Rhythm: Normal rate. Rhythm irregular.      Heart sounds: No murmur heard.     No friction rub. No gallop.   Pulmonary:      Effort: Pulmonary effort is normal.      Breath sounds: Normal breath sounds.   Abdominal:      Palpations: Abdomen is soft.   Musculoskeletal:      Cervical back: Neck supple.   Neurological:      Mental Status: She is alert.   Psychiatric:         Mood and Affect: Mood normal.         Behavior: Behavior normal.          Vitals:  There were no vitals taken for this visit.       Labs:   CBC  Lab Results   Component Value Date    WBC 4.3 (L) 08/15/2024    HGB 13.0 08/15/2024    HCT 42.5 08/15/2024    MCV 92 08/15/2024     08/15/2024        Renal Function Panel  Lab Results   Component Value Date    GLUCOSE 128 (H) 08/15/2024     08/15/2024    K 4.8 08/15/2024     (H) 08/15/2024    CO2 25 08/15/2024    ANIONGAP 15 08/15/2024    BUN 42 (H) 08/15/2024    CREATININE 2.81 (H) 08/15/2024    GFRF 17 (A) 05/02/2022    CALCIUM 8.8 08/15/2024        CMP  Lab Results   Component Value Date    CALCIUM 8.8 08/15/2024    PHOS 6.3 (H) 04/09/2022    PROT 7.0 08/15/2024    ALBUMIN 4.4 08/15/2024    AST 11 08/15/2024    ALT 9 08/15/2024    ALKPHOS 92 08/15/2024    BILITOT 0.6 08/15/2024       TSH  Lab Results   Component Value Date    TSH 2.65 08/15/2024    FREET4 1.20 04/03/2018          Cardiac Testing:  ECG  01/20/2025  atrial fibrillation with a heart rate  of 104 bpm.    Echocardiogram  08/04/2022  PHYSICIAN INTERPRETATION:  ZANDRA Details: The ZANDRA probe used was Hithru [id number]. Technically adequate omniplane transesophageal echocardiogram performed. Color flow Doppler echo was performed to assess for the presence of a patent foramen ovale.  ZANDRA Medication: The pharynx was anesthetized with Cetacaine spray and viscous lidocaine. The patient was sedated by Anesthesia; please refer to anesthesia flow sheet for medications used. Total intraservice time for moderate sedation was 24 minutes.  ZANDRA Procedure: The probe was passed without difficulty. The patient tolerated the procedure well without any apparent complications.  Left Ventricle: The left ventricular systolic function is normal, with an estimated ejection fraction of 55-60%. The left ventricular cavity size is not assessed. Left ventricular diastolic filling was not assessed.  Left Atrium: The left atrium is mildly dilated. The left atrial appendage Doppler velocities are normal, there is faint spontaneous contrast noted in the left atrial appendage, there is no thrombus visualized in the left atrial appendage and there is a prominent pectinate muscle in left atrial appendage.  Right Ventricle: The right ventricle is normal in size. There is normal right ventricular global systolic function.  Right Atrium: The right atrium is mildly dilated. The right atrium has a prominent Chiari network.  Aortic Valve: The aortic valve is trileaflet. There is minimal aortic valve cusp calcification. There is no evidence of aortic valve stenosis.  There is no evidence of aortic valve regurgitation.  Mitral Valve: The mitral valve is normal in structure. There is trace mitral valve regurgitation.  Tricuspid Valve: The tricuspid valve is structurally normal. There is trace tricuspid regurgitation. The right ventricular systolic pressure is unable to be estimated.  Pulmonic Valve: The pulmonic valve is structurally normal. There is trace  pulmonic valve regurgitation.  Pericardium: There is no pericardial effusion noted.  Aorta: The aortic root is normal. There is plaque visualized in the descending aorta which is classified as a Grade 2 [mild (focal or diffuse) intimal thickening of 2-3 mm] atherosclerosis.  Pulmonary Veins: The pulmonary veins appear normal and return normally to the left atrium.  In comparison to the previous echocardiogram(s): Compared with study from 5/20/2021, no significant change.        CONCLUSIONS:   1. The left ventricular systolic function is normal with a 55-60% estimated ejection fraction.   2. There is plaque visualized in the descending aorta.   3. The left atrial appendage Doppler velocities are normal, there is faint spontaneous contrast noted in the left atrial appendage, there is no thrombus visualized in the left atrial appendage and there is a prominent pectinate muscle in left atrial appendage.      ECHO  12/17/2024  PHYSICIAN INTERPRETATION:  ZANDRA Details: The ZANDRA probe used was LUMO [employer] 6VT #[id number]. Technically adequate omniplane transesophageal echocardiogram performed. Color flow Doppler echo was performed to assess for the presence of a patent foramen ovale.  ZANDRA Medication: The pharynx was anesthetized with Cetacaine spray and viscous lidocaine. The patient was sedated by Anesthesia; please refer to anesthesia flow sheet for medications used. Total intraservice time for moderate sedation was 15 minutes.  ZANDRA Procedure: The probe was passed without difficulty. Complications encountered during procedure: Patient tolerated the procedure well without any apparent complications.  Left Ventricle: The left ventricular systolic function is normal, with a visually estimated ejection fraction of 55-60%. The left ventricular cavity size was not assessed. Left ventricular diastolic filling was not assessed.  Left Atrium: The left atrium is mildly dilated. There is a small secundum atrial septal defect with predominantly  left to right shunting across the atrial septum; demonstrated using color doppler and agitated saline contrast. A bubble study using agitated saline was performed. Bubble study is positive. The left atrial appendage Doppler velocities are normal, there is a prominent pectinate muscle in left atrial appendage and there is no thrombus visualized in the left atrial appendage.  Right Ventricle: The right ventricle is normal in size. There is normal right ventricular global systolic function.  Right Atrium: The right atrium is mildly dilated. The right atrium has a prominent Chiari network.  Aortic Valve: The aortic valve is trileaflet. There is mild aortic valve cusp calcification. There is no evidence of aortic valve regurgitation.  Mitral Valve: The mitral valve is mildly thickened. There is mild to moderate mitral annular calcification. There is mild mitral valve regurgitation.  Tricuspid Valve: The tricuspid valve is structurally normal. There is trace to mild tricuspid regurgitation. The right ventricular systolic pressure is unable to be estimated.  Pulmonic Valve: The pulmonic valve is structurally normal. There is physiologic pulmonic valve regurgitation.  Pericardium: Small pericardial effusion localized near the right ventricle.  Aorta: The aortic root is normal. There is plaque visualized in the ascending aorta, which is classified as a Grade 2 [mild (focal or diffuse) intimal thickening of 2-3 mm] atherosclerosis. There is plaque visualized in the descending aorta which is classified as a Grade 2 [mild (focal or diffuse) intimal thickening of 2-3 mm] atherosclerosis.  Pulmonary Veins: The pulmonary veins appear normal and return normally to the left atrium.        CONCLUSIONS:   1. The left ventricular systolic function is normal, with a visually estimated ejection fraction of 55-60%.   2. There is normal right ventricular global systolic function.   3. The left atrium is mildly dilated.   4. A bubble study  using agitated saline was performed. Bubble study is positive.   5. There is plaque visualized in the ascending aorta.   6. There is plaque visualized in the descending aorta.    Assessment/Plan:      Afib s/p ZANDRA/DC cardioversion with recurrence. I had a long discussion with the patient about treatment options for her atrial fibrillation.  I explained to the patient that antiarrhythmic drugs are not the best option for her due to her renal disease.  I suggested the option of catheter ablation of atrial fibrillation with PFA with Dr. Roberts, as the patient has a history of prior esophageal stretching, PFA would be safer than RF.  The patient states that she has OCD is very difficult for her to go to the main campus and she prefers not to have an ablation at this time but is willing to think about it and do some research.  For now, we will keep her on rate control strategy.  Patient states that at home she has been running between 70 and 100 bpm.  Will order a Holter monitor and schedule follow-up to reassess.

## 2025-01-20 ENCOUNTER — ANCILLARY PROCEDURE (OUTPATIENT)
Dept: CARDIOLOGY | Facility: HOSPITAL | Age: 70
End: 2025-01-20
Payer: MEDICARE

## 2025-01-20 ENCOUNTER — OFFICE VISIT (OUTPATIENT)
Dept: CARDIOLOGY | Facility: HOSPITAL | Age: 70
End: 2025-01-20
Payer: MEDICARE

## 2025-01-20 ENCOUNTER — APPOINTMENT (OUTPATIENT)
Dept: CARDIOLOGY | Facility: HOSPITAL | Age: 70
End: 2025-01-20
Payer: MEDICARE

## 2025-01-20 VITALS
SYSTOLIC BLOOD PRESSURE: 118 MMHG | DIASTOLIC BLOOD PRESSURE: 63 MMHG | WEIGHT: 293 LBS | OXYGEN SATURATION: 97 % | HEART RATE: 85 BPM | BODY MASS INDEX: 49.27 KG/M2

## 2025-01-20 DIAGNOSIS — I10 BENIGN ESSENTIAL HYPERTENSION: ICD-10-CM

## 2025-01-20 DIAGNOSIS — I48.19 PERSISTENT ATRIAL FIBRILLATION (MULTI): Primary | ICD-10-CM

## 2025-01-20 DIAGNOSIS — I48.19 PERSISTENT ATRIAL FIBRILLATION (MULTI): ICD-10-CM

## 2025-01-20 PROCEDURE — 3078F DIAST BP <80 MM HG: CPT | Performed by: STUDENT IN AN ORGANIZED HEALTH CARE EDUCATION/TRAINING PROGRAM

## 2025-01-20 PROCEDURE — 99214 OFFICE O/P EST MOD 30 MIN: CPT | Mod: 25 | Performed by: STUDENT IN AN ORGANIZED HEALTH CARE EDUCATION/TRAINING PROGRAM

## 2025-01-20 PROCEDURE — 1157F ADVNC CARE PLAN IN RCRD: CPT | Performed by: STUDENT IN AN ORGANIZED HEALTH CARE EDUCATION/TRAINING PROGRAM

## 2025-01-20 PROCEDURE — 3074F SYST BP LT 130 MM HG: CPT | Performed by: STUDENT IN AN ORGANIZED HEALTH CARE EDUCATION/TRAINING PROGRAM

## 2025-01-20 PROCEDURE — 1036F TOBACCO NON-USER: CPT | Performed by: STUDENT IN AN ORGANIZED HEALTH CARE EDUCATION/TRAINING PROGRAM

## 2025-01-20 PROCEDURE — 1159F MED LIST DOCD IN RCRD: CPT | Performed by: STUDENT IN AN ORGANIZED HEALTH CARE EDUCATION/TRAINING PROGRAM

## 2025-01-20 PROCEDURE — 99214 OFFICE O/P EST MOD 30 MIN: CPT | Performed by: STUDENT IN AN ORGANIZED HEALTH CARE EDUCATION/TRAINING PROGRAM

## 2025-01-20 PROCEDURE — 93005 ELECTROCARDIOGRAM TRACING: CPT | Performed by: STUDENT IN AN ORGANIZED HEALTH CARE EDUCATION/TRAINING PROGRAM

## 2025-01-25 LAB
ATRIAL RATE: 91 BPM
Q ONSET: 232 MS
QRS COUNT: 17 BEATS
QRS DURATION: 72 MS
QT INTERVAL: 352 MS
QTC CALCULATION(BAZETT): 462 MS
QTC FREDERICIA: 422 MS
R AXIS: -77 DEGREES
T AXIS: 19 DEGREES
T OFFSET: 408 MS
VENTRICULAR RATE: 104 BPM

## 2025-02-21 NOTE — PROGRESS NOTES
[facility] V[facility] Electrophysiology    Patient Name: Chanda Gallego  Patient : 1955    Referred for  pAfib/Zio results    History of Present Illness:  Chanda Gallego is a 70 y.o. year old female patient with:    pAfib: s/p ZANDRA/DC cardioversion with recurrence : A few weeks her monitor showed atrial fibrillation. Complains of general fatigue and occasional shortness of breath. Denies chest pain or heart palpitations.  Zio Monitor show Afib burden 100%.  HTN  Subclinical hypothyroidism  Prior esophageal stretching   CKD  Obesity    Patient here for follow-up today for atrial fibrillation.  She underwent ZANDRA cardioversion back in December but recurred a day later according to her.  Her EKG today shows atrial fibrillation with a heart rate of 104 bpm.     Historically, patient was not taking her Eliquis and was switched to Pradaxa.  She was then not taking her Pradaxa concerned with her renal function.  Pradaxa was reduced to 75 mg twice daily for that reason.    She is here today for a follow up. Her event monitor showed 100% atrial fibrillation with rates 38- bpm.  Her echocardiogram showed normal LVEF, positive bubble study, left atrium mildly dilated.  I discussed with the patient about the option of catheter ablation with pulsed field ablation.  The patient prefers not to have ablation at this point.      Past Medical History:  She has a past medical history of CHF (congestive heart failure), Chronic kidney disease, Hypertension, and Personal history of other diseases of the circulatory system.    Past Surgical History:  She has a past surgical history that includes Other surgical history (12/10/2019).      Social History:  She reports that she has never smoked. She has never used smokeless tobacco. She reports that she does not drink alcohol and does not use drugs.    Family History:  Family History   Problem Relation Name Age of Onset    Coronary artery disease Mother            at 92    Scoliosis Mother      Deafness Father      Prostate cancer Father           at 75    Other (congential deafness) Father's Brother          Allergies:  Codeine, Morphine, and Sulfa (sulfonamide antibiotics)    Outpatient Medications:  Current Outpatient Medications   Medication Instructions    acetaminophen (TYLENOL) 1,000 mg, Every 8 hours PRN    dabigatran etexilate (PRADAXA) 75 mg, oral, 2 times daily, Do not crush or chew.    dilTIAZem ER (TIADYLT ER) 360 mg, oral, Daily    gabapentin (NEURONTIN) 300 mg, oral, 3 times daily    hydrALAZINE (APRESOLINE) 50 mg, oral, 2 times daily    omeprazole (PRILOSEC) 40 mg, oral, Daily before breakfast    walker misc Patient requires Rolator for inability to walk greater than 150 ft.        ROS:  A 14 point review of systems was done and is negative other than as stated in HPI    Physical Exam  Constitutional:       Appearance: Normal appearance.   Cardiovascular:      Rate and Rhythm: Normal rate. Rhythm irregular.      Heart sounds: No murmur heard.     No friction rub. No gallop.   Pulmonary:      Effort: Pulmonary effort is normal.      Breath sounds: Normal breath sounds.   Abdominal:      Palpations: Abdomen is soft.   Musculoskeletal:      Cervical back: Neck supple.   Neurological:      Mental Status: She is alert.   Psychiatric:         Mood and Affect: Mood normal.         Behavior: Behavior normal.         Vitals:  There were no vitals taken for this visit.       Labs:   CBC  Lab Results   Component Value Date    WBC 4.3 (L) 08/15/2024    HGB 13.0 08/15/2024    HCT 42.5 08/15/2024    MCV 92 08/15/2024     08/15/2024        Renal Function Panel  Lab Results   Component Value Date    GLUCOSE 128 (H) 08/15/2024     08/15/2024    K 4.8 08/15/2024     (H) 08/15/2024    CO2 25 08/15/2024    ANIONGAP 15 08/15/2024    BUN 42 (H) 08/15/2024    CREATININE 2.81 (H) 08/15/2024    GFRF 17 (A) 2022    CALCIUM 8.8 08/15/2024        CMP  Lab  Results   Component Value Date    CALCIUM 8.8 08/15/2024    PHOS 6.3 (H) 04/09/2022    PROT 7.0 08/15/2024    ALBUMIN 4.4 08/15/2024    AST 11 08/15/2024    ALT 9 08/15/2024    ALKPHOS 92 08/15/2024    BILITOT 0.6 08/15/2024       TSH  Lab Results   Component Value Date    TSH 2.65 08/15/2024    FREET4 1.20 04/03/2018          Cardiac Testing:  ECG  02/24/2025      Echocardiogram  08/04/2022  PHYSICIAN INTERPRETATION:  ZANDRA Details: The ZANDRA probe used was mi[employer]ibris [id number]. Technically adequate omniplane transesophageal echocardiogram performed. Color flow Doppler echo was performed to assess for the presence of a patent foramen ovale.  ZANDRA Medication: The pharynx was anesthetized with Cetacaine spray and viscous lidocaine. The patient was sedated by Anesthesia; please refer to anesthesia flow sheet for medications used. Total intraservice time for moderate sedation was 24 minutes.  ZANDRA Procedure: The probe was passed without difficulty. The patient tolerated the procedure well without any apparent complications.  Left Ventricle: The left ventricular systolic function is normal, with an estimated ejection fraction of 55-60%. The left ventricular cavity size is not assessed. Left ventricular diastolic filling was not assessed.  Left Atrium: The left atrium is mildly dilated. The left atrial appendage Doppler velocities are normal, there is faint spontaneous contrast noted in the left atrial appendage, there is no thrombus visualized in the left atrial appendage and there is a prominent pectinate muscle in left atrial appendage.  Right Ventricle: The right ventricle is normal in size. There is normal right ventricular global systolic function.  Right Atrium: The right atrium is mildly dilated. The right atrium has a prominent Chiari network.  Aortic Valve: The aortic valve is trileaflet. There is minimal aortic valve cusp calcification. There is no evidence of aortic valve stenosis.  There is no evidence of aortic valve  regurgitation.  Mitral Valve: The mitral valve is normal in structure. There is trace mitral valve regurgitation.  Tricuspid Valve: The tricuspid valve is structurally normal. There is trace tricuspid regurgitation. The right ventricular systolic pressure is unable to be estimated.  Pulmonic Valve: The pulmonic valve is structurally normal. There is trace pulmonic valve regurgitation.  Pericardium: There is no pericardial effusion noted.  Aorta: The aortic root is normal. There is plaque visualized in the descending aorta which is classified as a Grade 2 [mild (focal or diffuse) intimal thickening of 2-3 mm] atherosclerosis.  Pulmonary Veins: The pulmonary veins appear normal and return normally to the left atrium.  In comparison to the previous echocardiogram(s): Compared with study from 5/20/2021, no significant change.        CONCLUSIONS:   1. The left ventricular systolic function is normal with a 55-60% estimated ejection fraction.   2. There is plaque visualized in the descending aorta.   3. The left atrial appendage Doppler velocities are normal, there is faint spontaneous contrast noted in the left atrial appendage, there is no thrombus visualized in the left atrial appendage and there is a prominent pectinate muscle in left atrial appendage.        ECHO  12/17/2024  PHYSICIAN INTERPRETATION:  ZANDRA Details: The ZANDRA probe used was [employer] 6VT #[id number]. Technically adequate omniplane transesophageal echocardiogram performed. Color flow Doppler echo was performed to assess for the presence of a patent foramen ovale.  ZANDRA Medication: The pharynx was anesthetized with Cetacaine spray and viscous lidocaine. The patient was sedated by Anesthesia; please refer to anesthesia flow sheet for medications used. Total intraservice time for moderate sedation was 15 minutes.  ZANDRA Procedure: The probe was passed without difficulty. Complications encountered during procedure: Patient tolerated the procedure well without any apparent  complications.  Left Ventricle: The left ventricular systolic function is normal, with a visually estimated ejection fraction of 55-60%. The left ventricular cavity size was not assessed. Left ventricular diastolic filling was not assessed.  Left Atrium: The left atrium is mildly dilated. There is a small secundum atrial septal defect with predominantly left to right shunting across the atrial septum; demonstrated using color doppler and agitated saline contrast. A bubble study using agitated saline was performed. Bubble study is positive. The left atrial appendage Doppler velocities are normal, there is a prominent pectinate muscle in left atrial appendage and there is no thrombus visualized in the left atrial appendage.  Right Ventricle: The right ventricle is normal in size. There is normal right ventricular global systolic function.  Right Atrium: The right atrium is mildly dilated. The right atrium has a prominent Chiari network.  Aortic Valve: The aortic valve is trileaflet. There is mild aortic valve cusp calcification. There is no evidence of aortic valve regurgitation.  Mitral Valve: The mitral valve is mildly thickened. There is mild to moderate mitral annular calcification. There is mild mitral valve regurgitation.  Tricuspid Valve: The tricuspid valve is structurally normal. There is trace to mild tricuspid regurgitation. The right ventricular systolic pressure is unable to be estimated.  Pulmonic Valve: The pulmonic valve is structurally normal. There is physiologic pulmonic valve regurgitation.  Pericardium: Small pericardial effusion localized near the right ventricle.  Aorta: The aortic root is normal. There is plaque visualized in the ascending aorta, which is classified as a Grade 2 [mild (focal or diffuse) intimal thickening of 2-3 mm] atherosclerosis. There is plaque visualized in the descending aorta which is classified as a Grade 2 [mild (focal or diffuse) intimal thickening of 2-3 mm]  atherosclerosis.  Pulmonary Veins: The pulmonary veins appear normal and return normally to the left atrium.        CONCLUSIONS:   1. The left ventricular systolic function is normal, with a visually estimated ejection fraction of 55-60%.   2. There is normal right ventricular global systolic function.   3. The left atrium is mildly dilated.   4. A bubble study using agitated saline was performed. Bubble study is positive.   5. There is plaque visualized in the ascending aorta.   6. There is plaque visualized in the descending aorta.      Zio Monitor Results  01/20/2025-02/03/2025  Atrial Fibrillation occurred continuously (100% burden), ranging from 38-  182 bpm (avg of 80 bpm). Isolated VEs were rare (<1.0%), VE Couplets  were rare (<1.0%), and no VE Triplets were present.      Assessment/Plan:  Her event monitor showed 100% atrial fibrillation with rates 38- bpm.  Her echocardiogram showed normal LVEF, positive bubble study, left atrium mildly dilated.  I discussed with the patient about the option of catheter ablation with pulsed field ablation due to her history of prior esophageal stretching.  The patient prefers not to have ablation at this point.  Since the patient has been in well controlled A-fib with mild symptoms and normal LVEF, for now we will keep rate control strategy.  Will switch her from Pradaxa to low-dose Eliquis.  Will repeat labs and schedule follow-up.  I also placed a consult to clinical pharmacy.

## 2025-02-24 ENCOUNTER — OFFICE VISIT (OUTPATIENT)
Dept: CARDIOLOGY | Facility: HOSPITAL | Age: 70
End: 2025-02-24
Payer: MEDICARE

## 2025-02-24 VITALS — HEART RATE: 109 BPM | SYSTOLIC BLOOD PRESSURE: 142 MMHG | OXYGEN SATURATION: 95 % | DIASTOLIC BLOOD PRESSURE: 84 MMHG

## 2025-02-24 DIAGNOSIS — I48.19 PERSISTENT ATRIAL FIBRILLATION (MULTI): Primary | ICD-10-CM

## 2025-02-24 PROCEDURE — 1159F MED LIST DOCD IN RCRD: CPT | Performed by: STUDENT IN AN ORGANIZED HEALTH CARE EDUCATION/TRAINING PROGRAM

## 2025-02-24 PROCEDURE — 3077F SYST BP >= 140 MM HG: CPT | Performed by: STUDENT IN AN ORGANIZED HEALTH CARE EDUCATION/TRAINING PROGRAM

## 2025-02-24 PROCEDURE — 1036F TOBACCO NON-USER: CPT | Performed by: STUDENT IN AN ORGANIZED HEALTH CARE EDUCATION/TRAINING PROGRAM

## 2025-02-24 PROCEDURE — 3079F DIAST BP 80-89 MM HG: CPT | Performed by: STUDENT IN AN ORGANIZED HEALTH CARE EDUCATION/TRAINING PROGRAM

## 2025-02-24 PROCEDURE — 99214 OFFICE O/P EST MOD 30 MIN: CPT | Performed by: STUDENT IN AN ORGANIZED HEALTH CARE EDUCATION/TRAINING PROGRAM

## 2025-02-24 PROCEDURE — 1157F ADVNC CARE PLAN IN RCRD: CPT | Performed by: STUDENT IN AN ORGANIZED HEALTH CARE EDUCATION/TRAINING PROGRAM

## 2025-03-14 DIAGNOSIS — I48.19 PERSISTENT ATRIAL FIBRILLATION (MULTI): ICD-10-CM

## 2025-03-14 RX ORDER — DABIGATRAN ETEXILATE 75 MG/1
75 CAPSULE ORAL 2 TIMES DAILY
Qty: 180 CAPSULE | Refills: 3 | Status: SHIPPED | OUTPATIENT
Start: 2025-03-14 | End: 2026-03-14

## 2025-03-26 DIAGNOSIS — I10 BENIGN ESSENTIAL HYPERTENSION: ICD-10-CM

## 2025-03-26 DIAGNOSIS — I48.91 UNSPECIFIED ATRIAL FIBRILLATION (MULTI): ICD-10-CM

## 2025-03-26 RX ORDER — DILTIAZEM HYDROCHLORIDE 360 MG/1
360 CAPSULE, EXTENDED RELEASE ORAL DAILY
Qty: 90 CAPSULE | Refills: 1 | Status: SHIPPED | OUTPATIENT
Start: 2025-03-26 | End: 2025-09-22

## 2025-03-26 NOTE — PROGRESS NOTES
"  Pharmacist Clinic: Cardiology Management    Chanda Gallego is a 70 y.o. female was referred to Clinical Pharmacy Team for Anticoagulation management.     Referring Provider: Lakshmi De Jesus MD    THIS IS A NEW PATIENT APPOINTMENT. PATIENT WILL BE ESTABLISHING CARE WITH CLINICAL PHARMACY.    Appointment was completed by Chanda who was reached at primary number.    Allergies Reviewed? Yes    Allergies   Allergen Reactions    Codeine Dizziness and Unknown    Morphine Other     \"Slept a lot and just didn't feel good\" per patient    Sulfa (Sulfonamide Antibiotics) Rash       Past Medical History:   Diagnosis Date    CHF (congestive heart failure)     Chronic kidney disease     Hypertension     Personal history of other diseases of the circulatory system     History of hypertension       Current Outpatient Medications on File Prior to Visit   Medication Sig Dispense Refill    acetaminophen (Tylenol) 500 mg tablet Take 2 tablets (1,000 mg) by mouth every 8 hours if needed for mild pain (1 - 3). In conjunction with the Gabapentin      dabigatran etexilate (Pradaxa) 75 mg capsule Take 1 capsule (75 mg) by mouth 2 times a day. Do not crush or chew. 180 capsule 3    dilTIAZem ER (Tiadylt ER) 360 mg 24 hr capsule Take 1 capsule (360 mg) by mouth once daily. 90 capsule 1    gabapentin (Neurontin) 300 mg capsule Take 1 capsule (300 mg) by mouth 3 times a day. 270 capsule 1    omeprazole (PriLOSEC) 40 mg DR capsule Take 1 capsule (40 mg) by mouth once daily in the morning. Take before meals. (Patient taking differently: Take 1 capsule (40 mg) by mouth once daily as needed.) 30 capsule 0    hydrALAZINE (Apresoline) 50 mg tablet Take 1 tablet (50 mg) by mouth 2 times a day. (Patient not taking: Reported on 3/28/2025) 180 tablet 1    walker misc Patient requires Rolator for inability to walk greater than 150 ft. 1 each 0    [DISCONTINUED] dilTIAZem ER (Tiadylt ER) 360 mg 24 hr capsule Take 1 capsule (360 mg) by mouth once " "daily. 90 capsule 1     No current facility-administered medications on file prior to visit.         RELEVANT LAB RESULTS:  Lab Results   Component Value Date    BILITOT 0.6 08/15/2024    CALCIUM 8.8 08/15/2024    CO2 25 08/15/2024     (H) 08/15/2024    CREATININE 2.81 (H) 08/15/2024    GLUCOSE 128 (H) 08/15/2024    ALKPHOS 92 08/15/2024    K 4.8 08/15/2024    PROT 7.0 08/15/2024     08/15/2024    AST 11 08/15/2024    ALT 9 08/15/2024    BUN 42 (H) 08/15/2024    ANIONGAP 15 08/15/2024    MG 1.77 04/08/2022    PHOS 6.3 (H) 04/09/2022    ALBUMIN 4.4 08/15/2024    LIPASE 18 04/03/2022    GFRF 17 (A) 05/02/2022     Lab Results   Component Value Date    TRIG 180 (H) 08/15/2024    CHOL 160 08/15/2024    LDLCALC 73 08/15/2024    HDL 50.7 08/15/2024     No results found for: \"BMCBC\", \"CBCDIF\"     PHARMACEUTICAL ASSESSMENT:    MEDICATION RECONCILIATION    Was a medication reconciliation completed at this visit? Yes  Home Pharmacy Reviewed? Yes, describe: [facility]    Added:  - none  Changed:  - does not use hydralazine every day, using omeprazole prn instead of daily  Removed:  - none    Drug Interactions? No    Medication Documentation Review Audit       Reviewed by Theresa Moore MA (Medical Assistant) on 02/24/25 at 0957      Medication Order Taking? Sig Documenting Provider Last Dose Status   acetaminophen (Tylenol) 500 mg tablet [id number] Yes Take 2 tablets (1,000 mg) by mouth every 8 hours if needed for mild pain (1 - 3). In conjunction with the Gabapentin Historical Provider, MD 12/17/2024 Morning Active   dabigatran etexilate (Pradaxa) 75 mg capsule [id number] Yes Take 1 capsule (75 mg) by mouth 2 times a day. Do not crush or chew. Lakshmi De Jesus MD 12/17/2024 Morning Active   dilTIAZem ER (Tiadylt ER) 360 mg 24 hr capsule [id number] No Take 1 capsule (360 mg) by mouth once daily. Jenna Alan, DO 12/17/2024 Morning Active   gabapentin (Neurontin) 300 mg capsule [id number] Yes Take 1 capsule (300 mg) by " mouth 3 times a day. Jenna Alan, DO 12/17/2024 Morning Active   hydrALAZINE (Apresoline) 50 mg tablet [id number] Yes Take 1 tablet (50 mg) by mouth 2 times a day. Jenna Alan, DO Past Week Active   omeprazole (PriLOSEC) 40 mg DR capsule [id number] Yes Take 1 capsule (40 mg) by mouth once daily in the morning. Take before meals. Tristin Stern, DO Past Week Active   walker misc [id number] Yes Patient requires Rolator for inability to walk greater than 150 ft. Dolores Molina, APRN-CNP 12/17/2024 Morning Active                    DISEASE MANAGEMENT ASSESSMENT:     ANTICOAGULATION ASSESSMENT    The ASCVD Risk score (Stephen [place], et al., 2019) failed to calculate for the following reasons:    Risk score cannot be calculated because patient has a medical history suggesting prior/existing ASCVD    DIAGNOSIS: prevention of nonvalvular atrial fibrilliation stroke and systemic embolism  - Patient is projected to be on anticoagulation indefinitely  - [id number] Score: [4] (only included if diagnosis is atrial fibrillation)   Age: [<65 (0)] [65-74 (+1)] [> 75 (+2)]: 1  Sex: [Male/Female (+1)]: 1  CHF history: [No/Yes(+1)]: 1  Hypertension history: [No/Yes(+1)]: 1  Stroke/TIA/thromboembolism history: [No/Yes(+2)]: 0  Vascular disease history (prior MI, peripheral artery disease, aortic plaque): [No/Yes(+1)]: 0  Diabetes history: [No/Yes(+1)]: 0    CURRENT PHARMACOTHERAPY:    Pradaxa 75mg twice daily  CrCl 38ml/min    RELEVANT PAST MEDICAL HISTORY:   Afib, CHF, HTN, CKD    Affordability/Accessibility: high cost with Eliquis using Pradaxa in place as a cheaper option  Adherence/Organization: reports adherence  Adverse Reactions: none reported  Recent Hospitalizations: none reported  Recent Falls/Trauma: none reported  Changes in Tobacco or Alcohol Intake:   Tobacco: does not use  Alcohol: does not use    EDUCATION/COUNSELING:   - Counseled patient on MOA, expectations, duration of therapy, contraindications,  administration, and monitoring parameters  - Counseled patient of side effects that are indicative of bleeding such as dark tarry stool, unexplainable bruising, or vomiting up a coffee ground like substance    DISCUSSION/NOTES:   Reported income under limit for  PAP to cover cost of Eliquis. Patient understands she will not use Eliquis and Pradaxa together. Change therapy when she is due for next dose.  Reports adherence to Pradaxa and has not had any recent bleeding or bruising.    ASSESSMENT:    Assessment/Plan   Problem List Items Addressed This Visit       Persistent atrial fibrillation (Multi)     No dose adjustments needed to Eliquis at today's visit based on their age, weight, and kidney function.          Relevant Orders    Referral to Clinical Pharmacy      Patient Assistance Program (PAP)    Application for program to be submitted for the following medications: [facility] Permanent Address: [facility]   Prescription Insurance:   Yes   Members of Household: 1   Files Taxes: No       Patient will be email financial information to pharmacist directly at [email or web address].    Patient verbally reports monthly or yearly income which is less than 400% federal poverty level    Patient aware this process may take up to 6 weeks.     If approved medication must be filled through [facility] and MEDICATION WILL BE MAILED TO PATIENT.         RECOMMENDATIONS/PLAN:    STOP  Pradaxa  START  Eliquis 5mg BID    Last Appnt with Referring Provider: 2/24/25  Next Appnt with Referring Provider: 8/25/25  Clinical Pharmacist follow up: 5/9/25  Type of Encounter: Vito Last, PharmD    Verbal consent to manage patient's drug therapy was obtained from the patient . They were informed they may decline to participate or withdraw from participation in pharmacy services at any time.    Continue all meds under the continuation of care with the referring provider and clinical pharmacy  team.

## 2025-03-28 ENCOUNTER — APPOINTMENT (OUTPATIENT)
Dept: PHARMACY | Facility: HOSPITAL | Age: 70
End: 2025-03-28
Payer: MEDICARE

## 2025-03-28 DIAGNOSIS — I48.19 PERSISTENT ATRIAL FIBRILLATION (MULTI): ICD-10-CM

## 2025-03-28 NOTE — Clinical Note
Patient's income reported to be under limit for  PAP will start application at this time. Patient understands to stop taking Pradaxa she has Eliquis delivered to her.

## 2025-03-31 DIAGNOSIS — I48.19 PERSISTENT ATRIAL FIBRILLATION (MULTI): Primary | ICD-10-CM

## 2025-04-04 PROCEDURE — RXMED WILLOW AMBULATORY MEDICATION CHARGE

## 2025-04-07 ENCOUNTER — TELEPHONE (OUTPATIENT)
Dept: PHARMACY | Facility: HOSPITAL | Age: 70
End: 2025-04-07
Payer: MEDICARE

## 2025-04-07 NOTE — TELEPHONE ENCOUNTER
Patient Assistance Program Approval:     We are pleased to inform you that your application for assistance has been approved.     This approval is valid through  4/4/26  as long as the following criteria continue to be satisfied:     Your medication (Eliquis) remains covered under your current insurance plan.   Your prescriber does not discontinue therapy.   You do not seek reimbursement from any other private or government-funded programs for the  medication.    Under this program, the pharmacy will first bill your insurance plan for your indemnified specified medication. The [facility] [employer] Assistance Fund will then offset your copay balance, so that your out-of pocket expense for your specialty medication will be $0.00.    Larry Last, PharmD    Health Maintenance Due   Topic Date Due   • Pneumococcal Vaccine 0-64 (1 - PCV) Never done   • COVID-19 Vaccine (2 - Booster for Pattie series) 11/18/2021   • Influenza Vaccine (1) 09/01/2022       Patient is due for topics as listed above but is not proceeding with Immunization(s) COVID-19, Influenza and Pneumococcal at this time.      How Severe Are Your Spot(S)?: moderate What Is The Reason For Today's Visit?: Full Body Skin Examination What Is The Reason For Today's Visit? (Being Monitored For X): concerning skin lesions on an annual basis

## 2025-04-08 ENCOUNTER — PHARMACY VISIT (OUTPATIENT)
Dept: PHARMACY | Facility: CLINIC | Age: 70
End: 2025-04-08
Payer: COMMERCIAL

## 2025-05-08 NOTE — PROGRESS NOTES
"  Pharmacist Clinic: Cardiology Management    Chanda Gallego is a 70 y.o. female was referred to Clinical Pharmacy Team for Anticoagulation management.     Referring Provider: Lakshmi De Jesus MD    THIS IS A FOLLOW UP PATIENT APPOINTMENT. AT LAST VISIT ON 3/28/25 WITH PHARMACIST (Larry Last).    Appointment was completed by Chanda who was reached at primary number.    REVIEW OF PAST APPNT (IF APPLICABLE):   During last appointment Chanda was screened and approved for  PAP with a renewal date of 4/4/26.    Allergies[1]    Medical History[2]    Medications Ordered Prior to Encounter[3]      RELEVANT LAB RESULTS:  Lab Results   Component Value Date    BILITOT 0.6 08/15/2024    CALCIUM 8.8 08/15/2024    CO2 25 08/15/2024     (H) 08/15/2024    CREATININE 2.81 (H) 08/15/2024    GLUCOSE 128 (H) 08/15/2024    ALKPHOS 92 08/15/2024    K 4.8 08/15/2024    PROT 7.0 08/15/2024     08/15/2024    AST 11 08/15/2024    ALT 9 08/15/2024    BUN 42 (H) 08/15/2024    ANIONGAP 15 08/15/2024    MG 1.77 04/08/2022    PHOS 6.3 (H) 04/09/2022    ALBUMIN 4.4 08/15/2024    LIPASE 18 04/03/2022    GFRF 17 (A) 05/02/2022     Lab Results   Component Value Date    TRIG 180 (H) 08/15/2024    CHOL 160 08/15/2024    LDLCALC 73 08/15/2024    HDL 50.7 08/15/2024     No results found for: \"BMCBC\", \"CBCDIF\"     PHARMACEUTICAL ASSESSMENT:    MEDICATION RECONCILIATION    Was a medication reconciliation completed at this visit? No    Drug Interactions? No    Medication Documentation Review Audit       Reviewed by Theresa Moore MA (Medical Assistant) on 02/24/25 at 0957      Medication Order Taking? Sig Documenting Provider Last Dose Status   acetaminophen (Tylenol) 500 mg tablet [id number] Yes Take 2 tablets (1,000 mg) by mouth every 8 hours if needed for mild pain (1 - 3). In conjunction with the Gabapentin Historical Provider, MD 12/17/2024 Morning Active   dabigatran etexilate (Pradaxa) 75 mg capsule [id number] Yes Take 1 capsule " (75 mg) by mouth 2 times a day. Do not crush or chew. Lakshmi De Jesus MD 12/17/2024 Morning Active   dilTIAZem ER (Tiadylt ER) 360 mg 24 hr capsule [id number] No Take 1 capsule (360 mg) by mouth once daily. Jenna Alan, DO 12/17/2024 Morning Active   gabapentin (Neurontin) 300 mg capsule [id number] Yes Take 1 capsule (300 mg) by mouth 3 times a day. Jenna Alan, DO 12/17/2024 Morning Active   hydrALAZINE (Apresoline) 50 mg tablet [id number] Yes Take 1 tablet (50 mg) by mouth 2 times a day. Jenna Alan, DO Past Week Active   omeprazole (PriLOSEC) 40 mg DR capsule [id number] Yes Take 1 capsule (40 mg) by mouth once daily in the morning. Take before meals. Tristin Stern, DO Past Week Active   walker misc [id number] Yes Patient requires Rolator for inability to walk greater than 150 ft. Dolores Molina, MARIA DOLORES-CNP 12/17/2024 Morning Active                    DISEASE MANAGEMENT ASSESSMENT:     ANTICOAGULATION ASSESSMENT    The ASCVD Risk score (Stephen MONTANA, et al., 2019) failed to calculate for the following reasons:    Risk score cannot be calculated because patient has a medical history suggesting prior/existing ASCVD    DIAGNOSIS: prevention of nonvalvular atrial fibrilliation stroke and systemic embolism  - Patient is projected to be on anticoagulation indefinitely  - [id number] Score: [4] (only included if diagnosis is atrial fibrillation)   Age: [<65 (0)] [65-74 (+1)] [> 75 (+2)]: 1  Sex: [Male/Female (+1)]: 1  CHF history: [No/Yes(+1)]: 1  Hypertension history: [No/Yes(+1)]: 1  Stroke/TIA/thromboembolism history: [No/Yes(+2)]: 0  Vascular disease history (prior MI, peripheral artery disease, aortic plaque): [No/Yes(+1)]: 0  Diabetes history: [No/Yes(+1)]: 0    CURRENT PHARMACOTHERAPY:   Pradaxa 75mg BID  Currently using supply of Pradaxa that was last picked up from her [facility] pharmacy.   Eliquis was delivered from  pharmacy, but she has not started.    RELEVANT PAST MEDICAL HISTORY:   Afib,  CHF, HTN, CKD     Affordability/Accessibility:  PAP  Adherence/Organization: reports adherence  Adverse Reactions: none reported  Recent Hospitalizations: none reported  Recent Falls/Trauma: none reported  Changes in Tobacco or Alcohol Intake:   Tobacco: does not use  Alcohol: does not use    EDUCATION/COUNSELING:   - Counseled patient on MOA, expectations, duration of therapy, contraindications, administration, and monitoring parameters  - Counseled patient of side effects that are indicative of bleeding such as dark tarry stool, unexplainable bruising, or vomiting up a coffee ground like substance       DISCUSSION/NOTES:   Chanda received Eliquis from  Pharmacy, but unsure where the package came from so she was hesitant to use the Eliquis tablets.  She is currently using Pradaxa. Discussed how  pharmacies process deliveries where  [facility] is billing and confirming delivery dates and refills while the  central fill pharmacy out of [place] physically ships the medication out to her apartment complex through [employer].   Patient notes she will think about changing to using the Eliquis that was mailed from the  pharmacy.    ASSESSMENT:    Assessment/Plan   Problem List Items Addressed This Visit       Persistent atrial fibrillation (Multi)    Taking appropriate dose of Pradaxa based on kidney function.  Eliquis 5mg BID appropriate for age, weight, and Scr if she were to make the change in therapy.              RECOMMENDATIONS/PLAN:    STOP  Pradaxa  START  Eliquis 5mg BID    Last Appnt with Referring Provider: 2/28/25  Next Appnt with Referring Provider: 8/25/25  Clinical Pharmacist follow up: pending patient's approval to use  pharmacy assistance program  VAF/Application Expiration: Yes    Date: 4/4/26  Type of Encounter: Vito Last PharmD    Verbal consent to manage patient's drug therapy was obtained from the patient . They were informed they may decline to participate or  "withdraw from participation in pharmacy services at any time.    Continue all meds under the continuation of care with the referring provider and clinical pharmacy team.            [1]   Allergies  Allergen Reactions    Codeine Dizziness and Unknown    Morphine Other     \"Slept a lot and just didn't feel good\" per patient    Sulfa (Sulfonamide Antibiotics) Rash   [2]   Past Medical History:  Diagnosis Date    CHF (congestive heart failure)     Chronic kidney disease     Hypertension     Personal history of other diseases of the circulatory system     History of hypertension   [3]   Current Outpatient Medications on File Prior to Visit   Medication Sig Dispense Refill    acetaminophen (Tylenol) 500 mg tablet Take 2 tablets (1,000 mg) by mouth every 8 hours if needed for mild pain (1 - 3). In conjunction with the Gabapentin      apixaban (Eliquis) 5 mg tablet Take 1 tablet (5 mg) by mouth 2 times a day. 180 tablet 3    dilTIAZem ER (Tiadylt ER) 360 mg 24 hr capsule Take 1 capsule (360 mg) by mouth once daily. 90 capsule 1    gabapentin (Neurontin) 300 mg capsule Take 1 capsule (300 mg) by mouth 3 times a day. 270 capsule 1    hydrALAZINE (Apresoline) 50 mg tablet Take 1 tablet (50 mg) by mouth 2 times a day. (Patient not taking: Reported on 3/28/2025) 180 tablet 1    omeprazole (PriLOSEC) 40 mg DR capsule Take 1 capsule (40 mg) by mouth once daily in the morning. Take before meals. (Patient taking differently: Take 1 capsule (40 mg) by mouth once daily as needed.) 30 capsule 0    walker misc Patient requires Rolator for inability to walk greater than 150 ft. 1 each 0     No current facility-administered medications on file prior to visit.     "

## 2025-05-09 ENCOUNTER — APPOINTMENT (OUTPATIENT)
Dept: PHARMACY | Facility: HOSPITAL | Age: 70
End: 2025-05-09
Payer: MEDICARE

## 2025-05-09 DIAGNOSIS — I48.19 PERSISTENT ATRIAL FIBRILLATION (MULTI): ICD-10-CM

## 2025-05-09 NOTE — ASSESSMENT & PLAN NOTE
Taking appropriate dose of Pradaxa based on kidney function.  Eliquis 5mg BID appropriate for age, weight, and Scr if she were to make the change in therapy.

## 2025-05-09 NOTE — Clinical Note
Chanda received her Eliquis shipment, but was refusing to start it because she thought it was all a scam. How the delivery was set up through [employer] out of the  pharmacy did not make sense to her, so she was nervous someone was scamming her. She is still using the Pradaxa she had from her last pickup, and noted after our conversation may start using the Eliquis, but I have no idea if she had any confidence in what I was saying.

## 2025-06-10 NOTE — PROGRESS NOTES
Subjective   Reason for Visit: Chanda Gallego is an 70 y.o. female here for new patient visit.          BETO Armas is a 70 year old female here for new patient visit, medicare annual wellness. Last seen in primary care 7/16/24  Dr. Alan. Past medical history includes hypertension, hyperlipidemia, afib, chronic kidney disease stage 4, nephrolithiasis, recurrent obstructive uropathy-previous urostomy tubes 2022, GERD, chronic leg swelling/lymphedema, osteoarthritis of knees, vitamin D deficiency.  Lives in [facility] of independent living. Does own shopping, meal preparing, finances. Ambulates with cane, uses walker around her apartment.     Actively follows cardiology for AFIB and HTN. Last office visit 2/24/25 Dr. Henriquez. Is working with clinical pharmacy with transitioning from pradaxa to eliquis. Has not switched to eliquis yet. Taking pradaxa at present. Has supply of eliquis but has not started yet.     BP -takes diltiazem daily. Monitors BP at home. Stopped taking hydralazine. States BP runs low at times-98/67 on her machine and is afraid of falling so stopped hydralazine. Denies chest pain, palpitations. Will get sob with exertion intermittently.  Sometimes can walk 90 steps before has to stop due to tired and winded, other times it is 30 steps. Has severe arthritis in both knees, limits mobility and feels has deconditioned.  History of chronic kidney disease stage 4 Last labs 8/15/24 eGFR 17, Has not engaged with nephrology since 2022. Last office visit Dr. Garay 6/15/22. Did not like when kidney doctor told her she had to stop hot dogs, gutiérrez, sausage. Appointment scheduled for her was also in [place] which is too far for her to drive. She does not currently follow any particular diet in regards to kidney disease. Does not cook. Relies on packaged prepared foods, at times will eat ice cream for breakfast and lunch. Eats a lot of potato chips. Has chronic lower extremity lymphedema, currently no  open areas or weeping, has had both in past. Currently feel legs look good, tries to elevate. History of urinary incontinence for few years. Wears depends. Worse at night. Denies dysuria, flank pain, malaise or feeling unwell. States urinary symptoms are not new, tries to do exercises for bladder control, would like referral for urology.      Interested in bariatric surgery for weight loss. Feels difficult to exercise due to pain to knees.     Takes gabapentin three times a day- makes her sleep all day, relieves her knee discomfort.                          Lives in [facility]. Senior living. Independent. Has . Meals are packed. Has ice cream for breakfast . Lightly salted potato chips. Does not cook. Order grocery online- drives to store and . Manages finances and bills. Ambulates with cane. In apartment has silver walker.           Family Hx:  CAD: mother                    DM:no                    Cancer:father prostate                    Mental health:no    Colonoscopy: has never had-not interested. Will think about cologuard  Mammogram: not interested  Dexa: will consider        Current Providers  Specialists: I have reviewed specialist-related care of the patient in the medical record.               Health Maintenance Due   Topic Date Due    Bone Density Scan  Never done    Colorectal Cancer Screening  Never done    Hepatitis C Screening  Never done    CKD: Urine Protein Screening  Never done    DTaP/Tdap/Td Vaccines (1 - Tdap) Never done    Mammogram  Never done    Zoster Vaccines (1 of 2) Never done    RSV High Risk: (Elderly (60+) or Pregnant Population) (1 - Risk 60-74 years 1-dose series) Never done    Pneumococcal Vaccine (2 of 2 - PCV) 04/09/2023    COVID-19 Vaccine (2 - 2024-25 season) 09/01/2024       RX Allergies[1]            No visits with results within 60 Day(s) from this visit.   Latest known visit with results is:   Office Visit on 01/20/2025   Component Date Value Ref  "Range Status    Ventricular Rate 01/20/2025 104  BPM Final    Atrial Rate 01/20/2025 91  BPM Final    QRS Duration 01/20/2025 72  ms Final    QT Interval 01/20/2025 352  ms Final    QTC Calculation(Bazett) 01/20/2025 462  ms Final    R Axis 01/20/2025 -77  degrees Final    T Axis 01/20/2025 19  degrees Final    QRS Count 01/20/2025 17  beats Final    Q Onset 01/20/2025 232  ms Final    T Offset 01/20/2025 408  ms Final    QTC Fredericia 01/20/2025 422  ms Final         Patient Care Team:  Jennifer Jones, APRN-CNP as PCP - General (Internal Medicine)  Aylin Figueroa APRGEORGIA-CNP as PCP - MSSP ACO Attributed Provider     Review of Systems   Constitutional:  Negative for chills, fatigue and fever.   HENT:  Negative for trouble swallowing and voice change.    Respiratory:  Positive for shortness of breath (at times can walk 90 steps without difficulty. Other times has to stop after less. varies.). Negative for cough and wheezing.    Cardiovascular:  Positive for leg swelling (bilateral lymphedema). Negative for chest pain.   Gastrointestinal:  Negative for abdominal pain, blood in stool, diarrhea and nausea.   Genitourinary:  Negative for difficulty urinating, dysuria and hematuria.        Loss of bladder control for few years     Musculoskeletal:  Positive for arthralgias (bilateral chronic knee pain).   Skin:  Positive for color change (left shin with brown discoloration, stasis changes). Negative for wound.   Neurological:  Negative for dizziness, seizures and headaches.       Objective   Vitals:  Pulse 108   Temp 36.9 °C (98.5 °F)   Ht 1.651 m (5' 5\")   Wt 136 kg (300 lb 12.8 oz)   SpO2 98%   BMI 50.06 kg/m²       Surgical History[2]    Current Outpatient Medications   Medication Instructions    acetaminophen (TYLENOL) 1,000 mg, Every 8 hours PRN    dabigatran etexilate (PRADAXA) 75 mg, 2 times daily    dilTIAZem ER (TIADYLT ER) 360 mg, oral, Daily    Eliquis 5 mg, oral, 2 times daily    gabapentin " (NEURONTIN) 300 mg, oral, Daily    hydrALAZINE (APRESOLINE) 50 mg, oral, 2 times daily    omeprazole (PRILOSEC) 40 mg, oral, Daily before breakfast    walker misc Patient requires Rolator for inability to walk greater than 150 ft.       Physical Exam  Constitutional:       General: She is not in acute distress.     Appearance: She is not toxic-appearing.   Cardiovascular:      Rate and Rhythm: Rhythm irregular.      Pulses:           Posterior tibial pulses are 1+ on the right side and 1+ on the left side.      Comments: Bilateral firm edema bilateral lower extremities with brown discoloration left shin    Pulmonary:      Breath sounds: No wheezing, rhonchi or rales.   Abdominal:      General: There is no distension.      Tenderness: There is no abdominal tenderness. There is no right CVA tenderness or left CVA tenderness.   Musculoskeletal:      Right lower leg: Edema present.      Left lower leg: Edema present.   Lymphadenopathy:      Cervical: No cervical adenopathy.   Skin:     General: Skin is warm.   Neurological:      Mental Status: She is oriented to person, place, and time.      Gait: Gait abnormal (ambulates with cane, slow intentional gait due to pain in knees).         Assessment & Plan  Routine general medical examination at health care facility    Well adult female exam-discussed diet/exercise, cardiac risk factors including wt mgmt, control of BP, chol, screen for DM. Discussed CA screening, including breast, colon, skin. Refuses mammogram, refuses colonoscopy, may consider cologuard and dexa. discussed  purpose and benefit of screenings for cancer, discussed risk of not screening    Recommend patient reach out to [facility] for assistance in new glasses. Patient wearing glasses with large cracks across both lens.   Orders:    Comprehensive Metabolic Panel; Future      Atrial fibrillation, unspecified type (Multi)    Cardiology Dr. Henriquez    diltiazem  mg daily  Pradaxa 75 mg  twice a day    Instructed to switch to eliquis 5 mg BID. Has the prescription- has not started yet. Has discussed with clinical pharmacist. Plans to switch therapy but has not switched as of today  Orders:    TSH with reflex to Free T4 if abnormal; Future    Benign essential hypertension  147/86   Last office reading 7/16/24 212/108     diltiazem  mg daily           Egfr 17 2024  K 4.8  Orders:    Comprehensive Metabolic Panel; Future    CBC; Future    TSH with reflex to Free T4 if abnormal; Future    Chronic kidney disease, stage 4 (severe) (Multi)  Has not followed up with previous recommendations   Egfr 17   8/15/24    Recommend re-establish with nephrology  Nutrition consult for awareness and to improve compliance with dietary management of CKD, HTN, Obesity  Orders:    Comprehensive Metabolic Panel; Future    Albumin-Creatinine Ratio, Urine Random; Future    CBC; Future    Referral to Nephrology; Future    Morbid obesity with BMI of 50.0-59.9, adult (Multi)    Patient requesting bariatric surgery referral  Discussed importance of lifestyle changes to facilitate improvement in overall health, obesity, chronic disease progression. Endorses eats ice cream sometimes for two meals in day and potato chips.  Discussed with patient current dietary choices are not consistent with improving overall health- states does not know what to eat, no one told her what things she can and can not eat. Discussed referral to nutrition therapy for consultation, education.   Orders:    Referral to Bariatric Surgery; Future    Referral to Nutrition Services; Future    TSH with reflex to Free T4 if abnormal; Future    Chronic pain of both knees    Egfr 17 8/15/24    Discussed decreased dose of gabapentin to 300 mg once a day with consideration of last known GFR, stage 4 CKD  and patient reporting sleeps all day with TID dosing    Orders:    gabapentin (Neurontin) 300 mg capsule; Take 1 capsule (300 mg) by mouth once  "daily.    Diabetes mellitus screening    Orders:    Hemoglobin A1C - Lab collect; Future    Screening for osteoporosis    Orders:    XR DEXA bone density; Future    Screening for hyperlipidemia    Orders:    Lipid Panel; Future    Asymptomatic menopausal state    Orders:    XR DEXA bone density; Future    Screening for malignant neoplasm of colon    Orders:    Cologuard® colon cancer screening; Future    Urinary incontinence, unspecified type    Orders:    Referral to Urology; Future    Lymphedema    Orders:    Referral to Physical Therapy; Future                                          [1]   Allergies  Allergen Reactions    Codeine Dizziness and Unknown    Morphine Other     \"Slept a lot and just didn't feel good\" per patient    Sulfa (Sulfonamide Antibiotics) Rash   [2]   Past Surgical History:  Procedure Laterality Date    OTHER SURGICAL HISTORY  12/10/2019    Esophagogastroduodenoscopy     "

## 2025-06-12 ENCOUNTER — APPOINTMENT (OUTPATIENT)
Dept: PRIMARY CARE | Facility: CLINIC | Age: 70
End: 2025-06-12
Payer: MEDICARE

## 2025-06-12 VITALS
OXYGEN SATURATION: 98 % | TEMPERATURE: 98.5 F | BODY MASS INDEX: 48.82 KG/M2 | HEIGHT: 65 IN | HEART RATE: 108 BPM | WEIGHT: 293 LBS

## 2025-06-12 DIAGNOSIS — Z78.0 ASYMPTOMATIC MENOPAUSAL STATE: ICD-10-CM

## 2025-06-12 DIAGNOSIS — G89.29 CHRONIC PAIN OF BOTH KNEES: ICD-10-CM

## 2025-06-12 DIAGNOSIS — I48.91 ATRIAL FIBRILLATION, UNSPECIFIED TYPE (MULTI): ICD-10-CM

## 2025-06-12 DIAGNOSIS — R32 URINARY INCONTINENCE, UNSPECIFIED TYPE: ICD-10-CM

## 2025-06-12 DIAGNOSIS — Z12.11 SCREENING FOR MALIGNANT NEOPLASM OF COLON: ICD-10-CM

## 2025-06-12 DIAGNOSIS — Z13.820 SCREENING FOR OSTEOPOROSIS: ICD-10-CM

## 2025-06-12 DIAGNOSIS — Z00.00 ROUTINE GENERAL MEDICAL EXAMINATION AT HEALTH CARE FACILITY: Primary | ICD-10-CM

## 2025-06-12 DIAGNOSIS — Z13.1 DIABETES MELLITUS SCREENING: ICD-10-CM

## 2025-06-12 DIAGNOSIS — E66.01 MORBID OBESITY WITH BMI OF 50.0-59.9, ADULT (MULTI): ICD-10-CM

## 2025-06-12 DIAGNOSIS — M25.562 CHRONIC PAIN OF BOTH KNEES: ICD-10-CM

## 2025-06-12 DIAGNOSIS — Z13.220 SCREENING FOR HYPERLIPIDEMIA: ICD-10-CM

## 2025-06-12 DIAGNOSIS — N18.4 CHRONIC KIDNEY DISEASE, STAGE 4 (SEVERE) (MULTI): ICD-10-CM

## 2025-06-12 DIAGNOSIS — I89.0 LYMPHEDEMA: ICD-10-CM

## 2025-06-12 DIAGNOSIS — M25.561 CHRONIC PAIN OF BOTH KNEES: ICD-10-CM

## 2025-06-12 DIAGNOSIS — I10 BENIGN ESSENTIAL HYPERTENSION: ICD-10-CM

## 2025-06-12 PROCEDURE — 1036F TOBACCO NON-USER: CPT | Performed by: NURSE PRACTITIONER

## 2025-06-12 PROCEDURE — 3008F BODY MASS INDEX DOCD: CPT | Performed by: NURSE PRACTITIONER

## 2025-06-12 PROCEDURE — 1160F RVW MEDS BY RX/DR IN RCRD: CPT | Performed by: NURSE PRACTITIONER

## 2025-06-12 PROCEDURE — G0439 PPPS, SUBSEQ VISIT: HCPCS | Performed by: NURSE PRACTITIONER

## 2025-06-12 PROCEDURE — 1170F FXNL STATUS ASSESSED: CPT | Performed by: NURSE PRACTITIONER

## 2025-06-12 PROCEDURE — 1159F MED LIST DOCD IN RCRD: CPT | Performed by: NURSE PRACTITIONER

## 2025-06-12 RX ORDER — GABAPENTIN 300 MG/1
300 CAPSULE ORAL DAILY
Start: 2025-06-12

## 2025-06-12 RX ORDER — DABIGATRAN ETEXILATE 75 MG/1
75 CAPSULE ORAL 2 TIMES DAILY
COMMUNITY

## 2025-06-12 ASSESSMENT — ENCOUNTER SYMPTOMS
TROUBLE SWALLOWING: 0
WHEEZING: 0
DIZZINESS: 0
HEMATURIA: 0
BLOOD IN STOOL: 0
NAUSEA: 0
WOUND: 0
DIFFICULTY URINATING: 0
DIARRHEA: 0
COUGH: 0
FEVER: 0
ABDOMINAL PAIN: 0
COLOR CHANGE: 1
ARTHRALGIAS: 1
CHILLS: 0
SEIZURES: 0
SHORTNESS OF BREATH: 1
DYSURIA: 0
HEADACHES: 0
VOICE CHANGE: 0
FATIGUE: 0

## 2025-06-12 ASSESSMENT — PATIENT HEALTH QUESTIONNAIRE - PHQ9
1. LITTLE INTEREST OR PLEASURE IN DOING THINGS: NOT AT ALL
SUM OF ALL RESPONSES TO PHQ9 QUESTIONS 1 AND 2: 0
2. FEELING DOWN, DEPRESSED OR HOPELESS: NOT AT ALL

## 2025-06-12 ASSESSMENT — ACTIVITIES OF DAILY LIVING (ADL)
TAKING_MEDICATION: INDEPENDENT
ASSISTIVE_DEVICE: EYEGLASSES;CANE
WALKS IN HOME: INDEPENDENT
FEEDING YOURSELF: INDEPENDENT
MANAGING_FINANCES: INDEPENDENT
PATIENT'S MEMORY ADEQUATE TO SAFELY COMPLETE DAILY ACTIVITIES?: YES
ADEQUATE_TO_COMPLETE_ADL: YES
BATHING: INDEPENDENT
GROOMING: INDEPENDENT
DRESSING YOURSELF: INDEPENDENT
DOING_HOUSEWORK: NEEDS ASSISTANCE
GROCERY_SHOPPING: INDEPENDENT
HEARING - RIGHT EAR: FUNCTIONAL
BATHING: INDEPENDENT
JUDGMENT_ADEQUATE_SAFELY_COMPLETE_DAILY_ACTIVITIES: YES
TOILETING: INDEPENDENT
DRESSING: INDEPENDENT

## 2025-06-12 NOTE — ASSESSMENT & PLAN NOTE
147/86   Last office reading 7/16/24 212/108     diltiazem  mg daily           Egfr 17 2024  K 4.8  Orders:    Comprehensive Metabolic Panel; Future    CBC; Future    TSH with reflex to Free T4 if abnormal; Future

## 2025-06-12 NOTE — ASSESSMENT & PLAN NOTE
Has not followed up with previous recommendations   Egfr 17   8/15/24    Recommend re-establish with nephrology  Nutrition consult for awareness and to improve compliance with dietary management of CKD, HTN, Obesity  Orders:    Comprehensive Metabolic Panel; Future    Albumin-Creatinine Ratio, Urine Random; Future    CBC; Future    Referral to Nephrology; Future

## 2025-06-12 NOTE — ASSESSMENT & PLAN NOTE
Egfr 17 8/15/24    Discussed decreased dose of gabapentin to 300 mg once a day with consideration of last known GFR, stage 4 CKD  and patient reporting sleeps all day with TID dosing    Orders:    gabapentin (Neurontin) 300 mg capsule; Take 1 capsule (300 mg) by mouth once daily.

## 2025-06-12 NOTE — ASSESSMENT & PLAN NOTE
Well adult female exam-discussed diet/exercise, cardiac risk factors including wt mgmt, control of BP, chol, screen for DM. Discussed CA screening, including breast, colon, skin. Refuses mammogram, refuses colonoscopy, may consider cologuard and dexa. discussed  purpose and benefit of screenings for cancer, discussed risk of not screening    Recommend patient reach out to [facility] for assistance in new glasses. Patient wearing glasses with large cracks across both lens.   Orders:    Comprehensive Metabolic Panel; Future

## 2025-06-12 NOTE — PATIENT INSTRUCTIONS
Chanda it was nice to meet you today.  Annual wellness: Recommend healthy dietary choices: nutrition referral has been placed for support in healthier diet choices. Please call [facility] of [facility] to explore help in getting new eyeglasses and snap nutrition assistance if qualify. Department on Aging  at [phone]  Afib: continue follow up with cardiology and daily medication regimen  Hypertension: continue daily medications, please obtain labs at [facility]  Kidney disease: please reconnect with nephrology.  Avoid nephrotoxins, continue daily medications to control BP  Lymphedema: referral placed for therapy/wrapping with physical therapy

## 2025-06-22 NOTE — PROGRESS NOTES
Subjective   Reason for Visit: Chanda Gallego is an 70 y.o. female here for follow up.           HPI  Chanda is a 70 year old female with past medical history significant for hypertension, hyperlipidemia, afib, chf, chronic kidney disease stage 4, nephrolithiasis, recurrent obstructive uropathy-previous urostomy tubes 2022, GERD, chronic leg swelling/lymphedema, osteoarthritis of knees, vitamin D deficiency.  Lives in [facility]. Does own shopping, meal preparing, finances. Ambulates with cane, uses walker around her apartment.   Here to review lab results ordered on initiation to care 6/12/25.       AIC-abnormal. Prediabetes 5.9%, last year AIC 5.3%. Endorses eating ice cream multiple times a day and potato chips-discussed changing behavior and feels she can cut down on ice cream. Referred to nutrition consult. Encouraged to have consultation. Feels she knows what she needs to eat-salads, lean protein like turkey.     Chronic kidney disease-EGFR 17 as in 2024. Discussed importance of blood pressure control, interventions to prevent progression from prediabetes to diabetes and how diabetes can affect kidney. Reviewed importance of establishing care with nephrology.       Reviewed normal tsh, cbc similar to last year, will continue to follow.         Denies chest pain, palpitations. Some shortness of breath with exertion- baseline. Taking medications to control BP and AFIB. Did not start eliquis. Still taking pradaxa. Encouraged to follow recommendation of cardiology on treatment. Has eliquis but has not switched off pradaxa yet.     Consults placed last visit for urology, physical therapy, nephrology, bariatric surgery, nutrition.                        Health Maintenance Due   Topic Date Due    Bone Density Scan  Never done    Colorectal Cancer Screening  Never done    Hepatitis C Screening  Never done    CKD: Urine Protein Screening  Never done    DTaP/Tdap/Td Vaccines (1 - Tdap) Never done     Mammogram  Never done    Zoster Vaccines (1 of 2) Never done    RSV High Risk: (Elderly (60+) or Pregnant Population) (1 - Risk 60-74 years 1-dose series) Never done    Pneumococcal Vaccine (2 of 2 - PCV) 04/09/2023    COVID-19 Vaccine (2 - 2024-25 season) 09/01/2024       RX Allergies[1]            Office Visit on 06/12/2025   Component Date Value Ref Range Status    HEMOGLOBIN A1c 06/24/2025 5.9 (H)  <5.7 % Final    Comment: For someone without known diabetes, a hemoglobin   A1c value between 5.7% and 6.4% is consistent with  prediabetes and should be confirmed with a   follow-up test.     For someone with known diabetes, a value <7%  indicates that their diabetes is well controlled. A1c  targets should be individualized based on duration of  diabetes, age, comorbid conditions, and other  considerations.     This assay result is consistent with an increased risk  of diabetes.     Currently, no consensus exists regarding use of  hemoglobin A1c for diagnosis of diabetes for children.         eAG (mg/dL) 06/24/2025 123  mg/dL Final    eAG (mmol/L) 06/24/2025 6.8  mmol/L Final    CHOLESTEROL, TOTAL 06/24/2025 161  <200 mg/dL Final    HDL CHOLESTEROL 06/24/2025 49 (L)  > OR = 50 mg/dL Final    TRIGLYCERIDES 06/24/2025 151 (H)  <150 mg/dL Final    LDL-CHOLESTEROL 06/24/2025 87  mg/dL (calc) Final    Comment: Reference range: <100     Desirable range <100 mg/dL for primary prevention;    <70 mg/dL for patients with CHD or diabetic patients   with > or = 2 CHD risk factors.     LDL-C is now calculated using the Marcelo-Kaycee   calculation, which is a validated novel method providing   better accuracy than the Friedewald equation in the   estimation of LDL-C.   Marcelo SS et al. ANJELICA. 2013;310(19): 5067-5218   (http://education.[employer].com/faq/WAD668)      CHOL/HDLC RATIO 06/24/2025 3.3  <5.0 (calc) Final    NON HDL CHOLESTEROL 06/24/2025 112  <130 mg/dL (calc) Final    Comment: For patients with diabetes plus  1 major ASCVD risk   factor, treating to a non-HDL-C goal of <100 mg/dL   (LDL-C of <70 mg/dL) is considered a therapeutic   option.      GLUCOSE 06/24/2025 118 (H)  65 - 99 mg/dL Final    Comment:               Fasting reference interval     For someone without known diabetes, a glucose value  between 100 and 125 mg/dL is consistent with  prediabetes and should be confirmed with a  follow-up test.         UREA NITROGEN (BUN) 06/24/2025 32 (H)  7 - 25 mg/dL Final    CREATININE 06/24/2025 2.88 (H)  0.60 - 1.00 mg/dL Final    EGFR 06/24/2025 17 (L)  > OR = 60 mL/min/1.73m2 Final    SODIUM 06/24/2025 141  135 - 146 mmol/L Final    POTASSIUM 06/24/2025 4.7  3.5 - 5.3 mmol/L Final    CHLORIDE 06/24/2025 109  98 - 110 mmol/L Final    CARBON DIOXIDE 06/24/2025 23  20 - 32 mmol/L Final    ELECTROLYTE BALANCE 06/24/2025 9  7 - 17 mmol/L (calc) Final    CALCIUM 06/24/2025 9.0  8.6 - 10.4 mg/dL Final    PROTEIN, TOTAL 06/24/2025 7.0  6.1 - 8.1 g/dL Final    ALBUMIN 06/24/2025 4.2  3.6 - 5.1 g/dL Final    BILIRUBIN, TOTAL 06/24/2025 0.5  0.2 - 1.2 mg/dL Final    ALKALINE PHOSPHATASE 06/24/2025 73  37 - 153 U/L Final    AST 06/24/2025 7 (L)  10 - 35 U/L Final    ALT 06/24/2025 5 (L)  6 - 29 U/L Final    WHITE BLOOD CELL COUNT 06/24/2025 4.5  3.8 - 10.8 Thousand/uL Final    RED BLOOD CELL COUNT 06/24/2025 4.65  3.80 - 5.10 Million/uL Final    HEMOGLOBIN 06/24/2025 12.0  11.7 - 15.5 g/dL Final    HEMATOCRIT 06/24/2025 40.5  35.0 - 45.0 % Final    MCV 06/24/2025 87.1  80.0 - 100.0 fL Final    MCH 06/24/2025 25.8 (L)  27.0 - 33.0 pg Final    MCHC 06/24/2025 29.6 (L)  32.0 - 36.0 g/dL Final    Comment: For adults, a slight decrease in the calculated MCHC  value (in the range of 30 to 32 g/dL) is most likely  not clinically significant; however, it should be  interpreted with caution in correlation with other  red cell parameters and the patient's clinical  condition.      RDW 06/24/2025 15.6 (H)  11.0 - 15.0 % Final    PLATELET COUNT  "06/24/2025 195  140 - 400 Thousand/uL Final    MPV 06/24/2025 10.6  7.5 - 12.5 fL Final    TSH W/REFLEX TO FT4 06/24/2025 2.50  0.40 - 4.50 mIU/L Final         Patient Care Team:  Jennifer Jones, APRN-CNP as PCP - General (Internal Medicine)  Aylin Figueroa, APRN-CNP as PCP - [facility] Attributed Provider     Review of Systems   Constitutional:  Negative for chills and fever.   HENT: Negative.     Respiratory:  Positive for shortness of breath (at times with activity, not new or worse). Negative for cough and wheezing.    Cardiovascular:  Positive for leg swelling (chronic, lymphedema). Negative for chest pain and palpitations.   Gastrointestinal:  Negative for anal bleeding, nausea and vomiting.   Genitourinary:  Negative for dysuria.        Stress incontinence endorsed     Musculoskeletal:  Positive for arthralgias (walks with cane, generalized arthritis, knee pain bilaterall).   Skin:  Positive for color change (left shin with stasis coloration).   Neurological:  Negative for dizziness, seizures, syncope and headaches.   Psychiatric/Behavioral: Negative.         Objective   Vitals:  /81   Pulse 100   Temp 36.8 °C (98.2 °F)   Ht 1.651 m (5' 5\")   Wt 132 kg (292 lb)   SpO2 98%   BMI 48.59 kg/m²       Surgical History[2]    Current Outpatient Medications   Medication Instructions    acetaminophen (TYLENOL) 1,000 mg, Every 8 hours PRN    cholecalciferol (VITAMIN D-3) 25 mcg, oral, Daily    dabigatran etexilate (PRADAXA) 75 mg, 2 times daily    dilTIAZem ER (TIADYLT ER) 360 mg, oral, Daily    Eliquis 5 mg, oral, 2 times daily    gabapentin (NEURONTIN) 300 mg, oral, Daily    hydrALAZINE (APRESOLINE) 50 mg, oral, 2 times daily    omeprazole (PRILOSEC) 40 mg, oral, Daily before breakfast    walker misc Patient requires Rolator for inability to walk greater than 150 ft.       Physical Exam  Constitutional:       General: She is not in acute distress.     Appearance: She is not ill-appearing or " toxic-appearing.   HENT:      Right Ear: External ear normal.      Left Ear: External ear normal.   Cardiovascular:      Rate and Rhythm: Normal rate. Rhythm irregular.      Pulses:           Posterior tibial pulses are 1+ on the right side and 1+ on the left side.      Comments: Bilateral firm edema bilateral lower extremities with brown discoloration left shin  Pulmonary:      Breath sounds: No wheezing, rhonchi or rales.   Musculoskeletal:      Right lower leg: Edema present.      Left lower leg: Edema present.   Skin:     General: Skin is warm.   Neurological:      Mental Status: She is oriented to person, place, and time.         Assessment & Plan  Atrial fibrillation, unspecified type (Multi)  Cardiology Dr. Henriquez    diltiazem  mg daily  Pradaxa 75 mg twice a day  (Has eliquis-has not taken)       Benign essential hypertension  Today 127/81  Orders:    dilTIAZem ER (Tiadylt ER) 360 mg 24 hr capsule; Take 1 capsule (360 mg) by mouth once daily.  6/12/25 147/86  office reading 7/16/24 212/108     diltiazem  mg daily           Egfr 17   K 4.7  Chronic kidney disease, stage 4 (severe) (Multi)  Nephrology consult  /81  Discussed importance of diet changes to prevent transition of prediabetes to diabetes      Has not followed up with previous recommendations for diet    Egfr  17  6/24/25  Egfr  17 8/15/24     Recommend re-establish with nephrology  Nutrition consult for awareness and to improve compliance with dietary management of CKD, HTN, Obesity, prediabetes    BMI 45.0-49.9, adult (Multi)  Nutrition consult  Bariatric surgery consult  Encourage diet, exercise       Chronic combined systolic and diastolic congestive heart failure  VSS  Weight down from last visit  /81  Continue current regimen         Unspecified atrial fibrillation (Multi)    Orders:    dilTIAZem ER (Tiadylt ER) 360 mg 24 hr capsule; Take 1 capsule (360 mg) by mouth once daily.    Gastroesophageal reflux disease  "without esophagitis    Orders:    omeprazole (PriLOSEC) 40 mg DR capsule; Take 1 capsule (40 mg) by mouth once daily in the morning. Take before meals.    Vitamin D deficiency  Vit D deficient 9 ng/ml 2024  Lab ordered to see current level  Supplement until get lab, will adjust dose once current lab results  Orders:    Vitamin D 25-Hydroxy,Total (for eval of Vitamin D levels); Future    cholecalciferol (Vitamin D-3) 25 mcg (1,000 units) capsule; Take 1 capsule (25 mcg) by mouth once daily.    Prediabetes  AIC 5.9%  Discussed dietary changes-endorses can change behaviors to improve AIC -will eliminate ice cream multiple times a day  Nutrition consult  Recheck in 3 months to assess impact of behavior change with diet                                   [1]   Allergies  Allergen Reactions    Codeine Dizziness and Unknown    Morphine Other     \"Slept a lot and just didn't feel good\" per patient    Sulfa (Sulfonamide Antibiotics) Rash   [2]   Past Surgical History:  Procedure Laterality Date    OTHER SURGICAL HISTORY  12/10/2019    Esophagogastroduodenoscopy     "

## 2025-06-25 LAB
ALBUMIN SERPL-MCNC: 4.2 G/DL (ref 3.6–5.1)
ALP SERPL-CCNC: 73 U/L (ref 37–153)
ALT SERPL-CCNC: 5 U/L (ref 6–29)
ANION GAP SERPL CALCULATED.4IONS-SCNC: 9 MMOL/L (CALC) (ref 7–17)
AST SERPL-CCNC: 7 U/L (ref 10–35)
BILIRUB SERPL-MCNC: 0.5 MG/DL (ref 0.2–1.2)
BUN SERPL-MCNC: 32 MG/DL (ref 7–25)
CALCIUM SERPL-MCNC: 9 MG/DL (ref 8.6–10.4)
CHLORIDE SERPL-SCNC: 109 MMOL/L (ref 98–110)
CHOLEST SERPL-MCNC: 161 MG/DL
CHOLEST/HDLC SERPL: 3.3 (CALC)
CO2 SERPL-SCNC: 23 MMOL/L (ref 20–32)
CREAT SERPL-MCNC: 2.88 MG/DL (ref 0.6–1)
EGFRCR SERPLBLD CKD-EPI 2021: 17 ML/MIN/1.73M2
ERYTHROCYTE [DISTWIDTH] IN BLOOD BY AUTOMATED COUNT: 15.6 % (ref 11–15)
EST. AVERAGE GLUCOSE BLD GHB EST-MCNC: 123 MG/DL
EST. AVERAGE GLUCOSE BLD GHB EST-SCNC: 6.8 MMOL/L
GLUCOSE SERPL-MCNC: 118 MG/DL (ref 65–99)
HBA1C MFR BLD: 5.9 %
HCT VFR BLD AUTO: 40.5 % (ref 35–45)
HDLC SERPL-MCNC: 49 MG/DL
HGB BLD-MCNC: 12 G/DL (ref 11.7–15.5)
LDLC SERPL CALC-MCNC: 87 MG/DL (CALC)
MCH RBC QN AUTO: 25.8 PG (ref 27–33)
MCHC RBC AUTO-ENTMCNC: 29.6 G/DL (ref 32–36)
MCV RBC AUTO: 87.1 FL (ref 80–100)
NONHDLC SERPL-MCNC: 112 MG/DL (CALC)
PLATELET # BLD AUTO: 195 THOUSAND/UL (ref 140–400)
PMV BLD REES-ECKER: 10.6 FL (ref 7.5–12.5)
POTASSIUM SERPL-SCNC: 4.7 MMOL/L (ref 3.5–5.3)
PROT SERPL-MCNC: 7 G/DL (ref 6.1–8.1)
RBC # BLD AUTO: 4.65 MILLION/UL (ref 3.8–5.1)
SODIUM SERPL-SCNC: 141 MMOL/L (ref 135–146)
TRIGL SERPL-MCNC: 151 MG/DL
TSH SERPL-ACNC: 2.5 MIU/L (ref 0.4–4.5)
WBC # BLD AUTO: 4.5 THOUSAND/UL (ref 3.8–10.8)

## 2025-06-26 ENCOUNTER — APPOINTMENT (OUTPATIENT)
Dept: PRIMARY CARE | Facility: CLINIC | Age: 70
End: 2025-06-26
Payer: MEDICARE

## 2025-06-26 VITALS
DIASTOLIC BLOOD PRESSURE: 81 MMHG | HEART RATE: 100 BPM | OXYGEN SATURATION: 98 % | SYSTOLIC BLOOD PRESSURE: 127 MMHG | WEIGHT: 292 LBS | TEMPERATURE: 98.2 F | BODY MASS INDEX: 48.65 KG/M2 | HEIGHT: 65 IN

## 2025-06-26 DIAGNOSIS — I48.91 UNSPECIFIED ATRIAL FIBRILLATION (MULTI): ICD-10-CM

## 2025-06-26 DIAGNOSIS — I50.42 CHRONIC COMBINED SYSTOLIC AND DIASTOLIC CONGESTIVE HEART FAILURE: ICD-10-CM

## 2025-06-26 DIAGNOSIS — I10 BENIGN ESSENTIAL HYPERTENSION: ICD-10-CM

## 2025-06-26 DIAGNOSIS — E55.9 VITAMIN D DEFICIENCY: ICD-10-CM

## 2025-06-26 DIAGNOSIS — K21.9 GASTROESOPHAGEAL REFLUX DISEASE WITHOUT ESOPHAGITIS: ICD-10-CM

## 2025-06-26 DIAGNOSIS — N18.4 CHRONIC KIDNEY DISEASE, STAGE 4 (SEVERE) (MULTI): ICD-10-CM

## 2025-06-26 DIAGNOSIS — I48.91 ATRIAL FIBRILLATION, UNSPECIFIED TYPE (MULTI): Primary | ICD-10-CM

## 2025-06-26 DIAGNOSIS — R73.03 PREDIABETES: ICD-10-CM

## 2025-06-26 PROCEDURE — 1036F TOBACCO NON-USER: CPT | Performed by: NURSE PRACTITIONER

## 2025-06-26 PROCEDURE — 3079F DIAST BP 80-89 MM HG: CPT | Performed by: NURSE PRACTITIONER

## 2025-06-26 PROCEDURE — 3008F BODY MASS INDEX DOCD: CPT | Performed by: NURSE PRACTITIONER

## 2025-06-26 PROCEDURE — 1159F MED LIST DOCD IN RCRD: CPT | Performed by: NURSE PRACTITIONER

## 2025-06-26 PROCEDURE — 99214 OFFICE O/P EST MOD 30 MIN: CPT | Performed by: NURSE PRACTITIONER

## 2025-06-26 PROCEDURE — 3074F SYST BP LT 130 MM HG: CPT | Performed by: NURSE PRACTITIONER

## 2025-06-26 PROCEDURE — 1160F RVW MEDS BY RX/DR IN RCRD: CPT | Performed by: NURSE PRACTITIONER

## 2025-06-26 RX ORDER — VIT C/E/ZN/COPPR/LUTEIN/ZEAXAN 250MG-90MG
25 CAPSULE ORAL DAILY
Qty: 30 CAPSULE | Refills: 11 | Status: SHIPPED | OUTPATIENT
Start: 2025-06-26 | End: 2026-06-26

## 2025-06-26 RX ORDER — DILTIAZEM HYDROCHLORIDE 360 MG/1
360 CAPSULE, EXTENDED RELEASE ORAL DAILY
Qty: 90 CAPSULE | Refills: 1 | Status: SHIPPED | OUTPATIENT
Start: 2025-06-26 | End: 2025-12-23

## 2025-06-26 RX ORDER — OMEPRAZOLE 40 MG/1
40 CAPSULE, DELAYED RELEASE ORAL
Qty: 30 CAPSULE | Refills: 0 | Status: SHIPPED | OUTPATIENT
Start: 2025-06-26

## 2025-06-26 ASSESSMENT — PATIENT HEALTH QUESTIONNAIRE - PHQ9
1. LITTLE INTEREST OR PLEASURE IN DOING THINGS: NOT AT ALL
2. FEELING DOWN, DEPRESSED OR HOPELESS: NOT AT ALL
SUM OF ALL RESPONSES TO PHQ9 QUESTIONS 1 AND 2: 0

## 2025-06-26 ASSESSMENT — ENCOUNTER SYMPTOMS
SEIZURES: 0
PSYCHIATRIC NEGATIVE: 1
NAUSEA: 0
CHILLS: 0
SHORTNESS OF BREATH: 1
DIZZINESS: 0
DYSURIA: 0
VOMITING: 0
COLOR CHANGE: 1
PALPITATIONS: 0
FEVER: 0
COUGH: 0
ANAL BLEEDING: 0
HEADACHES: 0
ARTHRALGIAS: 1
WHEEZING: 0

## 2025-06-26 NOTE — PATIENT INSTRUCTIONS
Chanda-  Review of your labs today indicates you are prediabetic. To prevent progression from prediabetes to diabetes it is important to change diet and lose weight. A referral was placed at last visit for nutrition consult and bariatric consult. Sandy good is another resource [phone]  Please call [phone] to schedule appointment with Nutrition consult  Please establish care with nephrologist, urologist, physical therapy.   RTC in 3 months to monitor lifestyle changes impact on AIC  Continue current medications

## 2025-06-26 NOTE — ASSESSMENT & PLAN NOTE
Nephrology consult  /81  Discussed importance of diet changes to prevent transition of prediabetes to diabetes      Has not followed up with previous recommendations for diet    Egfr  17  6/24/25  Egfr  17 8/15/24     Recommend re-establish with nephrology  Nutrition consult for awareness and to improve compliance with dietary management of CKD, HTN, Obesity, prediabetes

## 2025-06-26 NOTE — ASSESSMENT & PLAN NOTE
Vit D deficient 9 ng/ml 2024  Lab ordered to see current level  Supplement until get lab, will adjust dose once current lab results  Orders:    Vitamin D 25-Hydroxy,Total (for eval of Vitamin D levels); Future    cholecalciferol (Vitamin D-3) 25 mcg (1,000 units) capsule; Take 1 capsule (25 mcg) by mouth once daily.

## 2025-06-26 NOTE — ASSESSMENT & PLAN NOTE
Today 127/81  Orders:    dilTIAZem ER (Tiadylt ER) 360 mg 24 hr capsule; Take 1 capsule (360 mg) by mouth once daily.  6/12/25 147/86  office reading 7/16/24 212/108     diltiazem  mg daily           Egfr 17   K 4.7

## 2025-06-26 NOTE — ASSESSMENT & PLAN NOTE
AIC 5.9%  Discussed dietary changes-endorses can change behaviors to improve AIC -will eliminate ice cream multiple times a day  Nutrition consult  Recheck in 3 months to assess impact of behavior change with diet

## 2025-07-20 DIAGNOSIS — K21.9 GASTROESOPHAGEAL REFLUX DISEASE WITHOUT ESOPHAGITIS: ICD-10-CM

## 2025-07-21 RX ORDER — OMEPRAZOLE 40 MG/1
40 CAPSULE, DELAYED RELEASE ORAL
Qty: 90 CAPSULE | Refills: 1 | Status: SHIPPED | OUTPATIENT
Start: 2025-07-21

## 2025-07-24 ENCOUNTER — TELEPHONE (OUTPATIENT)
Dept: SURGERY | Facility: CLINIC | Age: 70
End: 2025-07-24
Payer: MEDICARE

## 2025-07-24 NOTE — TELEPHONE ENCOUNTER
RN called patient to assess if patient is interested in weight loss surgery. Patient verbalized that she has an appointment with Radha to discuss both medication and surgery. Patient is unsure at this time which is best for her. RN instructed patient that if she wished to have surgery, to call the bariatric office to get scheduled. Patient verbalized understanding.

## 2025-08-04 ENCOUNTER — APPOINTMENT (OUTPATIENT)
Dept: SURGERY | Facility: CLINIC | Age: 70
End: 2025-08-04
Payer: MEDICARE

## 2025-08-05 DIAGNOSIS — Z12.31 ENCOUNTER FOR SCREENING MAMMOGRAM FOR BREAST CANCER: ICD-10-CM

## 2025-08-21 ENCOUNTER — APPOINTMENT (OUTPATIENT)
Dept: SURGERY | Facility: CLINIC | Age: 70
End: 2025-08-21
Payer: MEDICARE

## 2025-08-25 ENCOUNTER — APPOINTMENT (OUTPATIENT)
Dept: CARDIOLOGY | Facility: HOSPITAL | Age: 70
End: 2025-08-25
Payer: MEDICARE

## 2025-09-05 DIAGNOSIS — M25.562 CHRONIC PAIN OF BOTH KNEES: ICD-10-CM

## 2025-09-05 DIAGNOSIS — G89.29 CHRONIC PAIN OF BOTH KNEES: ICD-10-CM

## 2025-09-05 DIAGNOSIS — M25.561 CHRONIC PAIN OF BOTH KNEES: ICD-10-CM

## 2025-09-05 RX ORDER — GABAPENTIN 300 MG/1
300 CAPSULE ORAL DAILY
Qty: 30 CAPSULE | Refills: 2 | Status: SHIPPED | OUTPATIENT
Start: 2025-09-05 | End: 2025-12-04

## 2025-09-11 ENCOUNTER — APPOINTMENT (OUTPATIENT)
Dept: SURGERY | Facility: CLINIC | Age: 70
End: 2025-09-11
Payer: MEDICARE

## 2025-09-11 ENCOUNTER — APPOINTMENT (OUTPATIENT)
Dept: CARDIOLOGY | Facility: HOSPITAL | Age: 70
End: 2025-09-11
Payer: MEDICARE